# Patient Record
Sex: MALE | Race: WHITE | NOT HISPANIC OR LATINO | Employment: FULL TIME | URBAN - METROPOLITAN AREA
[De-identification: names, ages, dates, MRNs, and addresses within clinical notes are randomized per-mention and may not be internally consistent; named-entity substitution may affect disease eponyms.]

---

## 2019-07-12 ENCOUNTER — OFFICE VISIT (OUTPATIENT)
Dept: INTERNAL MEDICINE | Facility: CLINIC | Age: 34
End: 2019-07-12
Payer: COMMERCIAL

## 2019-07-12 DIAGNOSIS — I10 ESSENTIAL HYPERTENSION: ICD-10-CM

## 2019-07-12 DIAGNOSIS — R79.89 ELEVATED LIVER FUNCTION TESTS: ICD-10-CM

## 2019-07-12 DIAGNOSIS — N50.819 PAIN IN TESTICLE: ICD-10-CM

## 2019-07-12 DIAGNOSIS — R79.89 ELEVATED TSH: ICD-10-CM

## 2019-07-12 DIAGNOSIS — E66.01 MORBID OBESITY (H): ICD-10-CM

## 2019-07-12 DIAGNOSIS — F32.0 MILD MAJOR DEPRESSION (H): ICD-10-CM

## 2019-07-12 DIAGNOSIS — Z00.00 ROUTINE GENERAL MEDICAL EXAMINATION AT A HEALTH CARE FACILITY: Primary | ICD-10-CM

## 2019-07-12 DIAGNOSIS — R06.83 SNORING: ICD-10-CM

## 2019-07-12 LAB
ALBUMIN SERPL-MCNC: 4 G/DL (ref 3.4–5)
ALP SERPL-CCNC: 68 U/L (ref 40–150)
ALT SERPL W P-5'-P-CCNC: 93 U/L (ref 0–70)
ANION GAP SERPL CALCULATED.3IONS-SCNC: 6 MMOL/L (ref 3–14)
AST SERPL W P-5'-P-CCNC: 45 U/L (ref 0–45)
BILIRUB SERPL-MCNC: 0.4 MG/DL (ref 0.2–1.3)
BUN SERPL-MCNC: 9 MG/DL (ref 7–30)
CALCIUM SERPL-MCNC: 8.5 MG/DL (ref 8.5–10.1)
CHLORIDE SERPL-SCNC: 105 MMOL/L (ref 94–109)
CHOLEST SERPL-MCNC: 183 MG/DL
CO2 SERPL-SCNC: 28 MMOL/L (ref 20–32)
CREAT SERPL-MCNC: 0.64 MG/DL (ref 0.66–1.25)
GFR SERPL CREATININE-BSD FRML MDRD: >90 ML/MIN/{1.73_M2}
GLUCOSE SERPL-MCNC: 103 MG/DL (ref 70–99)
HDLC SERPL-MCNC: 35 MG/DL
HGB BLD-MCNC: 14.5 G/DL (ref 13.3–17.7)
LDLC SERPL CALC-MCNC: 107 MG/DL
NONHDLC SERPL-MCNC: 148 MG/DL
POTASSIUM SERPL-SCNC: 4.1 MMOL/L (ref 3.4–5.3)
PROT SERPL-MCNC: 7.8 G/DL (ref 6.8–8.8)
SODIUM SERPL-SCNC: 139 MMOL/L (ref 133–144)
T4 FREE SERPL-MCNC: 1.1 NG/DL (ref 0.76–1.46)
TRIGL SERPL-MCNC: 206 MG/DL
TSH SERPL DL<=0.005 MIU/L-ACNC: 4.19 MU/L (ref 0.4–4)

## 2019-07-12 PROCEDURE — 99385 PREV VISIT NEW AGE 18-39: CPT | Performed by: INTERNAL MEDICINE

## 2019-07-12 PROCEDURE — 84443 ASSAY THYROID STIM HORMONE: CPT | Performed by: INTERNAL MEDICINE

## 2019-07-12 PROCEDURE — 80061 LIPID PANEL: CPT | Performed by: INTERNAL MEDICINE

## 2019-07-12 PROCEDURE — 80053 COMPREHEN METABOLIC PANEL: CPT | Performed by: INTERNAL MEDICINE

## 2019-07-12 PROCEDURE — 36415 COLL VENOUS BLD VENIPUNCTURE: CPT | Performed by: INTERNAL MEDICINE

## 2019-07-12 PROCEDURE — 85018 HEMOGLOBIN: CPT | Performed by: INTERNAL MEDICINE

## 2019-07-12 PROCEDURE — 99214 OFFICE O/P EST MOD 30 MIN: CPT | Mod: 25 | Performed by: INTERNAL MEDICINE

## 2019-07-12 PROCEDURE — 84439 ASSAY OF FREE THYROXINE: CPT | Performed by: INTERNAL MEDICINE

## 2019-07-12 RX ORDER — LISINOPRIL 20 MG/1
20 TABLET ORAL DAILY
Qty: 30 TABLET | Refills: 0 | Status: SHIPPED | OUTPATIENT
Start: 2019-07-12 | End: 2019-08-16

## 2019-07-12 ASSESSMENT — ENCOUNTER SYMPTOMS
ABDOMINAL PAIN: 0
JOINT SWELLING: 0
MYALGIAS: 0
EYE PAIN: 0
DIZZINESS: 0
HEMATOCHEZIA: 0
HEMATURIA: 0
SORE THROAT: 0
NAUSEA: 0
CONSTIPATION: 0
PARESTHESIAS: 0
FREQUENCY: 0
FEVER: 0
SHORTNESS OF BREATH: 0
COUGH: 0
WEAKNESS: 0
HEADACHES: 0
CHILLS: 0
PALPITATIONS: 0
DYSURIA: 0
DIARRHEA: 0
ARTHRALGIAS: 0
NERVOUS/ANXIOUS: 0
HEARTBURN: 0

## 2019-07-12 ASSESSMENT — MIFFLIN-ST. JEOR: SCORE: 2159.3

## 2019-07-12 NOTE — PROGRESS NOTES
SUBJECTIVE:   CC: Jareth Chaparro is an 34 year old male who presents for preventative health visit.     Healthy Habits:     Getting at least 3 servings of Calcium per day:  NO    Bi-annual eye exam:  Yes    Dental care twice a year:  NO    Sleep apnea or symptoms of sleep apnea:  Daytime drowsiness, Excessive snoring and Sleep apnea    Diet:  Regular (no restrictions)    Frequency of exercise:  None    Taking medications regularly:  Yes    Medication side effects:  None    PHQ-2 Total Score: 1    Additional concerns today:  Yes      Patient is also here to discuss several medical issues.    1.  Patient complains of snoring for several years and significant other has mentioned that patient stops breathing at times when sleeping, and patient would like to just with sleep apnea  2.Patient also complains of pain in his bilateral testicles since 2 months but more in the left testes, has not felt any lump.,  No history of injury  3.  Patient is also here to recheck on the blood pressure, has had few high blood pressure readings in the past.  4.  Patient also history of depression in the past and is not on any medications and does not like to take medications at this time.      Today's PHQ-2 Score:   PHQ-2 ( 1999 [employer]) 7/12/2019   Q1: Little interest or pleasure in doing things 1   Q2: Feeling down, depressed or hopeless 0   PHQ-2 Score 1   Q1: Little interest or pleasure in doing things Several days   Q2: Feeling down, depressed or hopeless Not at all   PHQ-2 Score 1       Abuse: Current or Past(Physical, Sexual or Emotional)- No  Do you feel safe in your environment? Yes      Past Medical History:   Diagnosis Date     Juvenile osteochondrosis of spine     chronic LBP     Mild major depression (H)      Other and unspecified disc disorder of unspecified region        Past Surgical History:   Procedure Laterality Date     TONSILLECTOMY         Current Outpatient Medications   Medication Sig Dispense Refill     lisinopril  (PRINIVIL/ZESTRIL) 20 MG tablet Take 1 tablet (20 mg) by mouth daily 30 tablet 0     MOTRIN 800 MG OR TABS ONE TABLET 3 TIMES A DAY WITH FOOD 60 0       Family History   Problem Relation Age of Onset     Other Cancer Father      Multiple myeloma Father      Lung Cancer Maternal Grandmother      Lung Cancer Maternal Grandfather          Social History     Tobacco Use     Smoking status: Light Tobacco Smoker     Types: Cigars     Smokeless tobacco: Never Used     Tobacco comment: 2 times a month   Substance Use Topics     Alcohol use: Yes     Comment: socially          Alcohol Use 7/12/2019   Prescreen: >3 drinks/day or >7 drinks/week? No       Last PSA: No results found for: PSA    Reviewed orders with patient. Reviewed health maintenance and updated orders accordingly - Yes    Reviewed and updated as needed this visit by clinical staff  Tobacco  Allergies  Meds  Med Hx  Surg Hx  Fam Hx  Soc Hx        Reviewed and updated as needed this visit by Provider            Review of Systems   Constitutional: Negative for chills and fever.   HENT: Negative for congestion, ear pain, hearing loss and sore throat.    Eyes: Negative for pain and visual disturbance.   Respiratory: Negative for cough and shortness of breath.         Snoring    Cardiovascular: Negative for chest pain, palpitations and peripheral edema.   Gastrointestinal: Negative for abdominal pain, constipation, diarrhea, heartburn, hematochezia and nausea.   Genitourinary: Negative for discharge, dysuria, frequency, genital sores, hematuria, impotence and urgency.        Pain testes   Musculoskeletal: Negative for arthralgias, joint swelling and myalgias.   Skin: Negative for rash.   Neurological: Negative for dizziness, weakness, headaches and paresthesias.   Psychiatric/Behavioral: Negative for mood changes. The patient is not nervous/anxious.        OBJECTIVE:   BP (!) 160/94 (BP Location: Right arm, Patient Position: Sitting, Cuff Size: Adult Large)    "Pulse 84   Temp 98.3  F (36.8  C) (Oral)   Resp 18   Ht 1.695 m (5' 6.75\")   Wt 126 kg (277 lb 11.2 oz)   SpO2 96%   BMI 43.82 kg/m      Physical Exam  GENERAL: healthy, alert and no distress  EYES: Eyes grossly normal to inspection, PERRL and conjunctivae and sclerae normal  HENT: ear canals and TM's normal, nose and mouth without ulcers or lesions  NECK: no adenopathy, no asymmetry, masses, or scars and thyroid normal to palpation  RESP: lungs clear to auscultation - no rales, rhonchi or wheezes  CV: regular rate and rhythm, normal S1 S2, no S3 or S4, no murmur, click or rub, no peripheral edema and peripheral pulses strong  ABDOMEN: soft, nontender, no hepatosplenomegaly, no masses and bowel sounds normal   (male):  Lt testes- small lump felt above lt testes, no lumps rt testes. ,no hernias and penis normal without urethral discharge  MS: no gross musculoskeletal defects noted, no edema  NEURO: Normal strength and tone, mentation intact and speech normal  PSYCH: mentation appears normal, affect normal/bright      ASSESSMENT/PLAN:     (Z00.00) Routine general medical examination at a health care facility  (primary encounter diagnosis)  Plan: Hemoglobin, Comprehensive metabolic panel,         Lipid panel reflex to direct LDL Fasting, TSH         with free T4 reflex, T4 free, T4 free            (R06.83) Snoring  Plan: r/o sleep apnea, will get sleep study SLEEP EVALUATION & MANAGEMENT REFERRAL - [facility]          [phone] (Age 18 and up)            (E66.01) Morbid obesity (H)  Plan: Discussed in detail about Diet,calorie intake,and importance of regular exercise,       (I10) Essential hypertension  Comment:;elevated BP  Plan: started on lisinopril (PRINIVIL/ZESTRIL) 20 MG tablet as directed.explained clearly about the medication,insructions and side effects.Advised to follow low salt diet and exercise and f/u in 4 wks       (N50.819) Pain in testicle  Plan: ? " "Varicocele lt testes, check US Testicular and Scrotum           (F32.0) Mild major depression (H)  Plan: PHQ 9 score 6 , not on any meds and  does not like to take medications .       COUNSELING:   Reviewed preventive health counseling, as reflected in patient instructions       Regular exercise       Healthy diet/nutrition    Estimated body mass index is 43.82 kg/m  as calculated from the following:    Height as of this encounter: 1.695 m (5' 6.75\").    Weight as of this encounter: 126 kg (277 lb 11.2 oz).     Weight management plan: Discussed healthy diet and exercise guidelines     reports that he has been smoking.  He has never used smokeless tobacco.  Tobacco Cessation Action Plan: pt will try on his own     Counseling Resources:  ATP IV Guidelines  Pooled Cohorts Equation Calculator  FRAX Risk Assessment  ICSI Preventive Guidelines  Dietary Guidelines for Americans, 2010  USDA's MyPlate  ASA Prophylaxis  Lung CA Screening    Bernardino Vanessa MD  [facility]  "

## 2019-07-13 VITALS
HEIGHT: 67 IN | HEART RATE: 84 BPM | TEMPERATURE: 98.3 F | SYSTOLIC BLOOD PRESSURE: 160 MMHG | RESPIRATION RATE: 18 BRPM | DIASTOLIC BLOOD PRESSURE: 94 MMHG | WEIGHT: 277.7 LBS | OXYGEN SATURATION: 96 % | BODY MASS INDEX: 43.58 KG/M2

## 2019-07-13 ASSESSMENT — PATIENT HEALTH QUESTIONNAIRE - PHQ9: SUM OF ALL RESPONSES TO PHQ QUESTIONS 1-9: 6

## 2019-07-25 ENCOUNTER — HOSPITAL ENCOUNTER (OUTPATIENT)
Dept: ULTRASOUND IMAGING | Facility: CLINIC | Age: 34
Discharge: HOME | End: 2019-07-25
Attending: INTERNAL MEDICINE | Admitting: INTERNAL MEDICINE
Payer: COMMERCIAL

## 2019-07-25 DIAGNOSIS — N50.819 PAIN IN TESTICLE: ICD-10-CM

## 2019-07-25 PROCEDURE — 76870 US EXAM SCROTUM: CPT

## 2019-08-09 ENCOUNTER — ANCILLARY PROCEDURE (OUTPATIENT)
Dept: GENERAL RADIOLOGY | Facility: CLINIC | Age: 34
End: 2019-08-09
Attending: NURSE PRACTITIONER
Payer: COMMERCIAL

## 2019-08-09 ENCOUNTER — OFFICE VISIT (OUTPATIENT)
Dept: URGENT CARE | Facility: URGENT CARE | Age: 34
End: 2019-08-09
Payer: COMMERCIAL

## 2019-08-09 VITALS
WEIGHT: 273.2 LBS | DIASTOLIC BLOOD PRESSURE: 70 MMHG | HEART RATE: 103 BPM | BODY MASS INDEX: 43.11 KG/M2 | SYSTOLIC BLOOD PRESSURE: 142 MMHG | TEMPERATURE: 101.2 F | OXYGEN SATURATION: 93 %

## 2019-08-09 DIAGNOSIS — R07.0 THROAT PAIN: Primary | ICD-10-CM

## 2019-08-09 DIAGNOSIS — J18.9 PNEUMONIA DUE TO INFECTIOUS ORGANISM, UNSPECIFIED LATERALITY, UNSPECIFIED PART OF LUNG: ICD-10-CM

## 2019-08-09 DIAGNOSIS — R05.8 PRODUCTIVE COUGH: ICD-10-CM

## 2019-08-09 LAB
DEPRECATED S PYO AG THROAT QL EIA: NORMAL
SPECIMEN SOURCE: NORMAL

## 2019-08-09 PROCEDURE — 87880 STREP A ASSAY W/OPTIC: CPT | Performed by: NURSE PRACTITIONER

## 2019-08-09 PROCEDURE — 99214 OFFICE O/P EST MOD 30 MIN: CPT | Performed by: NURSE PRACTITIONER

## 2019-08-09 PROCEDURE — 87081 CULTURE SCREEN ONLY: CPT | Performed by: NURSE PRACTITIONER

## 2019-08-09 PROCEDURE — 71046 X-RAY EXAM CHEST 2 VIEWS: CPT

## 2019-08-09 RX ORDER — AZITHROMYCIN 250 MG/1
TABLET, FILM COATED ORAL
Qty: 6 TABLET | Refills: 0 | Status: SHIPPED | OUTPATIENT
Start: 2019-08-09 | End: 2019-08-14

## 2019-08-09 NOTE — PROGRESS NOTES
SUBJECTIVE:   Jareth Chaparro is a 34 year old male presenting with a chief complaint of fever, stuffy nose, cough - productive, shortness of breath and sore throat.  Onset of symptoms was 5 day(s) ago.  Course of illness is worsening.    Severity moderate  Current and Associated symptoms: fever, chills, stuffy nose, cough - productive, shortness of breath and sore throat  Treatment measures tried include Tylenol/Ibuprofen, Fluids, Rest and mucinex.  Predisposing factors include None.    Past Medical History:   Diagnosis Date     Juvenile osteochondrosis of spine     chronic LBP     Mild major depression (H)      Other and unspecified disc disorder of unspecified region      Current Outpatient Medications   Medication Sig Dispense Refill     lisinopril (PRINIVIL/ZESTRIL) 20 MG tablet Take 1 tablet (20 mg) by mouth daily 30 tablet 0     MOTRIN 800 MG OR TABS ONE TABLET 3 TIMES A DAY WITH FOOD 60 0     Social History     Tobacco Use     Smoking status: Light Tobacco Smoker     Types: Cigars     Smokeless tobacco: Never Used     Tobacco comment: 2 times a month   Substance Use Topics     Alcohol use: Yes     Comment: socially        ROS:  Review of systems negative except as stated above.    OBJECTIVE:  BP (!) 142/70   Pulse 103   Temp 101.2  F (38.4  C) (Tympanic)   Wt 123.9 kg (273 lb 3.2 oz)   SpO2 93%   BMI 43.11 kg/m    GENERAL APPEARANCE: healthy, alert and no distress  EYES: EOMI,  PERRL, conjunctiva clear  HENT: ear canals and TM's normal.  Nose and mouth without ulcers, erythema or lesions  NECK: supple, nontender, no lymphadenopathy  RESP: lungs with scant rhonchi in the bases  CV: regular rates and rhythm-slightly tachycardic with , normal S1 S2, no murmur noted  ABDOMEN:  soft, nontender, no HSM or masses and bowel sounds normal  SKIN: no suspicious lesions or rashes    Chest x-ray: final read pending  Rapid strep negative    ASSESSMENT:  Community acquired pneumonia    PLAN:  1. Azithromycin X 5  days  2. Albuterol inhaler prn  3. Return if worsening  See orders in Epic    ISABEL Le, CNP

## 2019-08-09 NOTE — LETTER
August 9, 2019      To Whom It May Concern:      Jareth PEREZ Kileyannalisabernarda was seen in our Urgent Care today, 08/09/19.  Please excuse him from work until Tuesday August 13th.       Sincerely,        ISABEL Khan CNP

## 2019-08-10 LAB
BACTERIA SPEC CULT: NORMAL
SPECIMEN SOURCE: NORMAL

## 2019-08-10 NOTE — PATIENT INSTRUCTIONS
Patient Education     Treating Pneumonia  Pneumonia is an infection of one or both of the lungs. Pneumonia:    Is usually caused by either a virus or a bacteria    Can be very serious, especially in infants, young children, and older adults. It s also serious for those with other long-term health problems or weakened immune systems.    Is sometimes treated at home and sometimes in the hospital    Antibiotic medicines  Antibiotics may be prescribed for pneumonia caused by bacteria. They may be pills (oral medicines), or shots (injections). Or they may be given by IV (intravenously) into a vein. If you are taking oral medicines at home:    Fill your prescription and start taking your medicine as soon as you can.    You will likely start to feel better in a day or 2, but don t stop taking the antibiotic.    Use a pill organizer to help you remember to take your medicine.    Let your healthcare provider know if you have side effects.    Take your medicine exactly as directed on the label. Talk to your provider or pharmacist if you have any questions.  Antiviral medicines  Antiviral medicine may be prescribed for pneumonia caused by a virus. For example, antiviral medicine may be prescribed for pneumonia caused by the flu virus. Antibiotics do not work against viruses. If you are taking antiviral medicine at home:    Fill your prescription and start taking your medicine as soon as you can.    Talk with your provider or pharmacist about possible side effects.    Take the medicine exactly as instructed.  To relieve symptoms  There are many medicines that can help relieve symptoms of pneumonia. Some are prescription and some are over-the-counter.  Your healthcare provider may recommend:    Acetaminophen or ibuprofen to lower your fever and to lessen headache or other pain    Cough medicine to loosen mucus or to reduce coughing  Make sure you check with your healthcare provider or pharmacist before taking any  over-the-counter medicines.  Special treatments  If you are hospitalized for pneumonia, you may have other therapies, including:    Inhaled medicines to help with breathing or chest congestion    Supplemental oxygen to increase low oxygen levels  Drink fluids and eat healthy  You should eat healthy to help your body fight the infection. Drinking a lot of fluids helps to replace fluids lost from fever and to loosen mucus in your chest.    Diet. Make healthy food choices, including fruits and vegetables, lean meats and other proteins, 100% whole grain and low- or no-fat dairy products.    Fluids. Drink at least 6 to 8 tall glasses a day. Water and 100% fruit or vegetable juice are best.  Get plenty of rest and sleep  You may be more tired than usual for a while. It is important to get enough sleep at night. It s also important to rest during the day. Talk with your healthcare provider if coughing or other symptoms are interfering with your sleep.  Preventing the spread of germs  The best thing you can do to prevent spreading germs is to wash your hands often. You should:    Rub your hand with soap and water for 20 to 30 seconds.    Clean in between your fingers, the backs of your hands, and around your nails.    Dry your hands on a separate towel or use paper towels.  You should also:    Keep alcohol-based hand  nearby.    Make sure you also clean surfaces that you touch. Use a product that kills all types of germs.    Stay away from others until you are feeling better.  When to call your healthcare provider  Call your healthcare provider if you have any of the following:    Symptoms get worse    Fever continues    Shortness of breath gets worse    Increased mucus or mucus that is darker in color    Coughing gets worse    Lips or fingers are bluish in color    Side effects from your medicine   Date Last Reviewed: 12/1/2016 2000-2018 [employer]. [address] [place] [address], [place] [address]. All  rights reserved. This information is not intended as a substitute for professional medical care. Always follow your healthcare professional's instructions.

## 2019-08-12 ENCOUNTER — OFFICE VISIT (OUTPATIENT)
Dept: URGENT CARE | Facility: URGENT CARE | Age: 34
End: 2019-08-12
Payer: COMMERCIAL

## 2019-08-12 ENCOUNTER — ANCILLARY PROCEDURE (OUTPATIENT)
Dept: GENERAL RADIOLOGY | Facility: CLINIC | Age: 34
End: 2019-08-12
Attending: PHYSICIAN ASSISTANT
Payer: COMMERCIAL

## 2019-08-12 VITALS
BODY MASS INDEX: 43.87 KG/M2 | RESPIRATION RATE: 21 BRPM | DIASTOLIC BLOOD PRESSURE: 80 MMHG | SYSTOLIC BLOOD PRESSURE: 142 MMHG | TEMPERATURE: 101.1 F | WEIGHT: 278 LBS | OXYGEN SATURATION: 92 % | HEART RATE: 109 BPM

## 2019-08-12 DIAGNOSIS — R05.8 PRODUCTIVE COUGH: ICD-10-CM

## 2019-08-12 DIAGNOSIS — R06.2 WHEEZING: ICD-10-CM

## 2019-08-12 DIAGNOSIS — R05.8 PRODUCTIVE COUGH: Primary | ICD-10-CM

## 2019-08-12 PROCEDURE — 99214 OFFICE O/P EST MOD 30 MIN: CPT | Mod: 25 | Performed by: PHYSICIAN ASSISTANT

## 2019-08-12 PROCEDURE — 71046 X-RAY EXAM CHEST 2 VIEWS: CPT

## 2019-08-12 PROCEDURE — 94640 AIRWAY INHALATION TREATMENT: CPT | Performed by: PHYSICIAN ASSISTANT

## 2019-08-12 RX ORDER — IPRATROPIUM BROMIDE AND ALBUTEROL SULFATE 2.5; .5 MG/3ML; MG/3ML
3 SOLUTION RESPIRATORY (INHALATION) ONCE
Status: COMPLETED | OUTPATIENT
Start: 2019-08-12 | End: 2019-08-12

## 2019-08-12 RX ORDER — PREDNISONE 20 MG/1
20 TABLET ORAL DAILY
Qty: 5 TABLET | Refills: 0 | Status: SHIPPED | OUTPATIENT
Start: 2019-08-12 | End: 2019-08-16

## 2019-08-12 RX ORDER — CODEINE PHOSPHATE AND GUAIFENESIN 10; 100 MG/5ML; MG/5ML
1-2 SOLUTION ORAL EVERY 4 HOURS PRN
Qty: 240 ML | Refills: 0 | Status: SHIPPED | OUTPATIENT
Start: 2019-08-12 | End: 2019-08-16

## 2019-08-12 RX ORDER — DOXYCYCLINE 100 MG/1
100 CAPSULE ORAL 2 TIMES DAILY
Qty: 20 CAPSULE | Refills: 0 | Status: SHIPPED | OUTPATIENT
Start: 2019-08-12 | End: 2019-08-14 | Stop reason: ALTCHOICE

## 2019-08-12 RX ADMIN — IPRATROPIUM BROMIDE AND ALBUTEROL SULFATE 3 ML: 2.5; .5 SOLUTION RESPIRATORY (INHALATION) at 21:13

## 2019-08-12 NOTE — LETTER
[facility]  [address] [address]  [phone]      August 12, 2019    RE:  Jareth Chaparro                                                                                                                                                       [address] [facility] [address]            To whom it may concern:    Jareth Chaparro was seen in clinic today for a febrile illness.  He may return to work when he has been fever free for 24 hours.        Sincerely,        Glenys Sultana    [facility]

## 2019-08-13 ENCOUNTER — TELEPHONE (OUTPATIENT)
Dept: URGENT CARE | Facility: URGENT CARE | Age: 34
End: 2019-08-13

## 2019-08-13 NOTE — PROGRESS NOTES
Patient presents with:  Urgent Care: cough, sob, trouble breathing deeply, lightheaded, eye drainage with crusting, loss of voice.       SUBJECTIVE:   Jareth Chaparro is a 34 year old male presenting with a chief complaint of   1) persistent productive cough for the past 5 days  2) feels short of breath, using albuterol inhaler.    3) persistent fever    Onset of symptoms was 1 week ago  Course of illness is worsening.    Severity moderate  Current and Associated symptoms: as above  Treatment measures tried include albuterol inhaler and zithromax.  Predisposing factors include None.    Past Medical History:   Diagnosis Date     Juvenile osteochondrosis of spine     chronic LBP     Mild major depression (H)      Other and unspecified disc disorder of unspecified region      Patient Active Problem List   Diagnosis     Backache     Depressive disorder, not elsewhere classified     Morbid obesity (H)     Mild major depression (H)     Social History     Tobacco Use     Smoking status: Light Tobacco Smoker     Types: Cigars     Smokeless tobacco: Never Used     Tobacco comment: 2 times a month   Substance Use Topics     Alcohol use: Yes     Comment: socially        ROS:  CONSTITUTIONAL:NEGATIVE for fever, chills, change in weight  INTEGUMENTARY/SKIN: NEGATIVE for worrisome rashes, moles or lesions  EYES: NEGATIVE for vision changes or irritation  ENT/MOUTH: as per HPI  RESP:as per HPI  CV: NEGATIVE for chest pain, palpitations or peripheral edema  GI: NEGATIVE for nausea, abdominal pain, heartburn, or change in bowel habits  MUSCULOSKELETAL: NEGATIVE for significant arthralgias or myalgia  NEURO: NEGATIVE for weakness, dizziness or paresthesias  Review of systems negative except as stated above.    OBJECTIVE  :BP (!) 142/80   Pulse 109   Temp 101.1  F (38.4  C) (Oral)   Resp 21   Wt 126.1 kg (278 lb)   SpO2 92%   BMI 43.87 kg/m    GENERAL APPEARANCE: healthy, alert and no distress  EYES: EOMI,  PERRL, conjunctiva  clear  HENT: ear canals and TM's normal.  Nose and mouth without ulcers, erythema or lesions  NECK: supple, nontender, no lymphadenopathy  RESP: wheezing which improves with neb in clinic   CV: regular rates and rhythm, normal S1 S2, no murmur noted  ABDOMEN:  soft, nontender, no HSM or masses and bowel sounds normal  NEURO: Normal strength and tone, sensory exam grossly normal,  normal speech and mentation  SKIN: no suspicious lesions or rashes    CXR: no change from previous as per my interpretation    (R05) Productive cough  (primary encounter diagnosis)  Comment:   Plan: XR Chest 2 Views, doxycycline hyclate         (VIBRAMYCIN) 100 MG capsule,         guaiFENesin-codeine (ROBITUSSIN AC) 100-10         MG/5ML solution            (R06.2) Wheezing  Comment:   Plan: INHALATION/NEBULIZER TREATMENT, INITIAL,         ipratropium - albuterol 0.5 mg/2.5 mg/3 mL         (DUONEB) neb solution 3 mL, predniSONE         (DELTASONE) 20 MG tablet          Follow up with primary clinic for re-check within 3-5 days    Warm compress to eyes as needed.      Albuterol inhaler as needed.

## 2019-08-13 NOTE — PATIENT INSTRUCTIONS
(R05) Productive cough  (primary encounter diagnosis)  Comment:   Plan: XR Chest 2 Views, doxycycline hyclate         (VIBRAMYCIN) 100 MG capsule,         guaiFENesin-codeine (ROBITUSSIN AC) 100-10         MG/5ML solution            (R06.2) Wheezing  Comment:   Plan: INHALATION/NEBULIZER TREATMENT, INITIAL,         ipratropium - albuterol 0.5 mg/2.5 mg/3 mL         (DUONEB) neb solution 3 mL, predniSONE         (DELTASONE) 20 MG tablet          Follow up with primary clinic for re-check within 3-5 days    Warm compress to eyes as needed.      Albuterol inhaler as needed.

## 2019-08-14 ENCOUNTER — APPOINTMENT (OUTPATIENT)
Dept: GENERAL RADIOLOGY | Facility: CLINIC | Age: 34
End: 2019-08-14
Attending: EMERGENCY MEDICINE
Payer: COMMERCIAL

## 2019-08-14 ENCOUNTER — OFFICE VISIT (OUTPATIENT)
Dept: INTERNAL MEDICINE | Facility: CLINIC | Age: 34
End: 2019-08-14
Payer: COMMERCIAL

## 2019-08-14 ENCOUNTER — HOSPITAL ENCOUNTER (EMERGENCY)
Facility: CLINIC | Age: 34
Discharge: HOME | End: 2019-08-14
Attending: EMERGENCY MEDICINE | Admitting: EMERGENCY MEDICINE
Payer: COMMERCIAL

## 2019-08-14 VITALS
WEIGHT: 275.6 LBS | SYSTOLIC BLOOD PRESSURE: 122 MMHG | RESPIRATION RATE: 22 BRPM | HEIGHT: 68 IN | BODY MASS INDEX: 41.77 KG/M2 | OXYGEN SATURATION: 90 % | DIASTOLIC BLOOD PRESSURE: 92 MMHG | TEMPERATURE: 98.7 F | HEART RATE: 99 BPM

## 2019-08-14 VITALS
OXYGEN SATURATION: 96 % | WEIGHT: 275 LBS | HEIGHT: 69 IN | BODY MASS INDEX: 40.73 KG/M2 | DIASTOLIC BLOOD PRESSURE: 92 MMHG | SYSTOLIC BLOOD PRESSURE: 145 MMHG | HEART RATE: 82 BPM | RESPIRATION RATE: 29 BRPM | TEMPERATURE: 98.7 F

## 2019-08-14 DIAGNOSIS — J18.9 PNEUMONIA OF RIGHT LUNG DUE TO INFECTIOUS ORGANISM, UNSPECIFIED PART OF LUNG: ICD-10-CM

## 2019-08-14 DIAGNOSIS — R06.03 RESPIRATORY DISTRESS: Primary | ICD-10-CM

## 2019-08-14 DIAGNOSIS — J20.9 ACUTE BRONCHITIS WITH SYMPTOMS > 10 DAYS: ICD-10-CM

## 2019-08-14 LAB
ALBUMIN SERPL-MCNC: 3.1 G/DL (ref 3.4–5)
ALP SERPL-CCNC: 106 U/L (ref 40–150)
ALT SERPL W P-5'-P-CCNC: 245 U/L (ref 0–70)
ANION GAP SERPL CALCULATED.3IONS-SCNC: 6 MMOL/L (ref 3–14)
AST SERPL W P-5'-P-CCNC: 146 U/L (ref 0–45)
BASOPHILS # BLD AUTO: 0.1 10E9/L (ref 0–0.2)
BASOPHILS NFR BLD AUTO: 0.3 %
BILIRUB SERPL-MCNC: 0.3 MG/DL (ref 0.2–1.3)
BUN SERPL-MCNC: 15 MG/DL (ref 7–30)
CALCIUM SERPL-MCNC: 9.2 MG/DL (ref 8.5–10.1)
CHLORIDE SERPL-SCNC: 102 MMOL/L (ref 94–109)
CO2 SERPL-SCNC: 30 MMOL/L (ref 20–32)
CREAT SERPL-MCNC: 0.61 MG/DL (ref 0.66–1.25)
DIFFERENTIAL METHOD BLD: ABNORMAL
EOSINOPHIL # BLD AUTO: 0 10E9/L (ref 0–0.7)
EOSINOPHIL NFR BLD AUTO: 0.2 %
ERYTHROCYTE [DISTWIDTH] IN BLOOD BY AUTOMATED COUNT: 13.7 % (ref 10–15)
GFR SERPL CREATININE-BSD FRML MDRD: >90 ML/MIN/{1.73_M2}
GLUCOSE SERPL-MCNC: 120 MG/DL (ref 70–99)
HCT VFR BLD AUTO: 39.7 % (ref 40–53)
HGB BLD-MCNC: 12.8 G/DL (ref 13.3–17.7)
IMM GRANULOCYTES # BLD: 0.4 10E9/L (ref 0–0.4)
IMM GRANULOCYTES NFR BLD: 2.4 %
LACTATE SERPL-SCNC: 1.3 MMOL/L (ref 0.4–2)
LYMPHOCYTES # BLD AUTO: 1.5 10E9/L (ref 0.8–5.3)
LYMPHOCYTES NFR BLD AUTO: 9.8 %
MCH RBC QN AUTO: 29.3 PG (ref 26.5–33)
MCHC RBC AUTO-ENTMCNC: 32.2 G/DL (ref 31.5–36.5)
MCV RBC AUTO: 91 FL (ref 78–100)
MONOCYTES # BLD AUTO: 0.5 10E9/L (ref 0–1.3)
MONOCYTES NFR BLD AUTO: 2.9 %
NEUTROPHILS # BLD AUTO: 13.3 10E9/L (ref 1.6–8.3)
NEUTROPHILS NFR BLD AUTO: 84.4 %
NRBC # BLD AUTO: 0 10*3/UL
NRBC BLD AUTO-RTO: 0 /100
PLATELET # BLD AUTO: 495 10E9/L (ref 150–450)
POTASSIUM SERPL-SCNC: 3.9 MMOL/L (ref 3.4–5.3)
PROT SERPL-MCNC: 8.7 G/DL (ref 6.8–8.8)
RBC # BLD AUTO: 4.37 10E12/L (ref 4.4–5.9)
SODIUM SERPL-SCNC: 138 MMOL/L (ref 133–144)
WBC # BLD AUTO: 15.7 10E9/L (ref 4–11)

## 2019-08-14 PROCEDURE — 36415 COLL VENOUS BLD VENIPUNCTURE: CPT | Performed by: EMERGENCY MEDICINE

## 2019-08-14 PROCEDURE — 96374 THER/PROPH/DIAG INJ IV PUSH: CPT

## 2019-08-14 PROCEDURE — 99214 OFFICE O/P EST MOD 30 MIN: CPT | Performed by: INTERNAL MEDICINE

## 2019-08-14 PROCEDURE — 25000128 H RX IP 250 OP 636: Performed by: EMERGENCY MEDICINE

## 2019-08-14 PROCEDURE — 93005 ELECTROCARDIOGRAM TRACING: CPT

## 2019-08-14 PROCEDURE — 99285 EMERGENCY DEPT VISIT HI MDM: CPT | Mod: 25

## 2019-08-14 PROCEDURE — 96361 HYDRATE IV INFUSION ADD-ON: CPT

## 2019-08-14 PROCEDURE — 94640 AIRWAY INHALATION TREATMENT: CPT

## 2019-08-14 PROCEDURE — 85025 COMPLETE CBC W/AUTO DIFF WBC: CPT | Performed by: EMERGENCY MEDICINE

## 2019-08-14 PROCEDURE — 71046 X-RAY EXAM CHEST 2 VIEWS: CPT

## 2019-08-14 PROCEDURE — 36415 COLL VENOUS BLD VENIPUNCTURE: CPT

## 2019-08-14 PROCEDURE — 83605 ASSAY OF LACTIC ACID: CPT | Performed by: EMERGENCY MEDICINE

## 2019-08-14 PROCEDURE — 25000125 ZZHC RX 250: Performed by: EMERGENCY MEDICINE

## 2019-08-14 PROCEDURE — 87040 BLOOD CULTURE FOR BACTERIA: CPT | Performed by: EMERGENCY MEDICINE

## 2019-08-14 PROCEDURE — 80053 COMPREHEN METABOLIC PANEL: CPT | Performed by: EMERGENCY MEDICINE

## 2019-08-14 RX ORDER — IPRATROPIUM BROMIDE AND ALBUTEROL SULFATE 2.5; .5 MG/3ML; MG/3ML
3 SOLUTION RESPIRATORY (INHALATION)
Status: DISCONTINUED | OUTPATIENT
Start: 2019-08-14 | End: 2019-08-14 | Stop reason: HOSPADM

## 2019-08-14 RX ORDER — SODIUM CHLORIDE 9 MG/ML
1000 INJECTION, SOLUTION INTRAVENOUS CONTINUOUS
Status: DISCONTINUED | OUTPATIENT
Start: 2019-08-14 | End: 2019-08-14 | Stop reason: HOSPADM

## 2019-08-14 RX ORDER — METHYLPREDNISOLONE SODIUM SUCCINATE 125 MG/2ML
125 INJECTION, POWDER, LYOPHILIZED, FOR SOLUTION INTRAMUSCULAR; INTRAVENOUS ONCE
Status: COMPLETED | OUTPATIENT
Start: 2019-08-14 | End: 2019-08-14

## 2019-08-14 RX ORDER — MORPHINE SULFATE 4 MG/ML
4 INJECTION, SOLUTION INTRAMUSCULAR; INTRAVENOUS
Status: DISCONTINUED | OUTPATIENT
Start: 2019-08-14 | End: 2019-08-14 | Stop reason: HOSPADM

## 2019-08-14 RX ORDER — METHYLPREDNISOLONE 4 MG
TABLET, DOSE PACK ORAL
Qty: 21 TABLET | Refills: 0 | Status: SHIPPED | OUTPATIENT
Start: 2019-08-14 | End: 2019-08-23

## 2019-08-14 RX ORDER — ONDANSETRON 2 MG/ML
4 INJECTION INTRAMUSCULAR; INTRAVENOUS EVERY 30 MIN PRN
Status: DISCONTINUED | OUTPATIENT
Start: 2019-08-14 | End: 2019-08-14 | Stop reason: HOSPADM

## 2019-08-14 RX ADMIN — SODIUM CHLORIDE 1000 ML: 9 INJECTION, SOLUTION INTRAVENOUS at 16:50

## 2019-08-14 RX ADMIN — IPRATROPIUM BROMIDE AND ALBUTEROL SULFATE 3 ML: .5; 3 SOLUTION RESPIRATORY (INHALATION) at 16:51

## 2019-08-14 RX ADMIN — METHYLPREDNISOLONE SODIUM SUCCINATE 125 MG: 125 INJECTION, POWDER, FOR SOLUTION INTRAMUSCULAR; INTRAVENOUS at 16:51

## 2019-08-14 RX ADMIN — IPRATROPIUM BROMIDE AND ALBUTEROL SULFATE 3 ML: .5; 3 SOLUTION RESPIRATORY (INHALATION) at 19:11

## 2019-08-14 ASSESSMENT — ENCOUNTER SYMPTOMS
SHORTNESS OF BREATH: 1
COUGH: 1
DIARRHEA: 0
VOMITING: 0
VOICE CHANGE: 1
FEVER: 1

## 2019-08-14 ASSESSMENT — MIFFLIN-ST. JEOR
SCORE: 2182.77
SCORE: 2161.67

## 2019-08-14 NOTE — ED PROVIDER NOTES
History     Chief Complaint:  Shortness of Breath    HPI  Jareth Chaparro is a 34 year old male who presents to the emergency department today for evaluation of a cough and shortness of breath that began 2 weeks ago. He was seen in urgent care with this and a fever of 101 and was diagnosed with laryngitis. He was prescribed Vibramycin here and has finished there course of antibiotics but still has a strong productive cough and hoarse voice. He reports his symptoms will seem to be getting better but this is often intermittent. He has an albuterol inhaler that he has been using 2-3x daily but feels this has stopped being any help for his symptoms. He denies any vomiting or diarrhea. Both his wife and daughter have been home sick with similar symptoms.       Allergies:  No known drug allergies    Medications:    Albuterol  Vibramycin  Robitussin  Lisinopril  Prednisone    Past Medical History:    Depressive disorder  Morbid obesity  Back pain    Past Surgical History:     tonsillectomy.    Family History:    The patient's family history includes Multiple myeloma in his father; Other Cancer in his father.    Social History:  The patient reports that he has been smoking cigars.  He has never used smokeless tobacco. He reports that he drinks alcohol. He reports that he does not use drugs.   PCP: Bernardino Vanessa  Marital Status:  [2]      Review of Systems   Constitutional: Positive for fever.   HENT: Positive for voice change.    Respiratory: Positive for cough and shortness of breath.    Gastrointestinal: Negative for diarrhea and vomiting.   All other systems reviewed and are negative.      Physical Exam     Patient Vitals for the past 24 hrs:   BP Temp Temp src Pulse Heart Rate Resp SpO2 Height Weight   08/14/19 1830 (!) 141/87 -- -- 105 92 11 96 % -- --   08/14/19 1800 (!) 140/80 -- -- 90 92 15 94 % -- --   08/14/19 1700 (!) 143/89 -- -- 87 92 (!) 34 95 % -- --   08/14/19 1616 (!) 147/93 98.7  F (37.1  " C) Oral -- 84 28 97 % 1.753 m (5' 9\") 124.7 kg (275 lb)     Physical Exam   Constitutional: He appears well-developed and well-nourished. He appears distressed.   HENT:   Right Ear: External ear normal.   Left Ear: External ear normal.   Mouth/Throat: Oropharynx is clear and moist. No oropharyngeal exudate.   TM's clear bilaterally   Eyes: Pupils are equal, round, and reactive to light. Conjunctivae and EOM are normal. No scleral icterus.   Neck: Normal range of motion. Neck supple.   Cardiovascular: Normal rate, regular rhythm, normal heart sounds and intact distal pulses. Exam reveals no gallop and no friction rub.   No murmur heard.  Pulmonary/Chest: Effort normal. No respiratory distress. He has no wheezes. He has no rales.   Active cough. Slight right basilar crackle.   Abdominal: Soft. Bowel sounds are normal. He exhibits no distension and no mass. There is no tenderness.   Musculoskeletal: He exhibits no edema.   Lymphadenopathy:     He has no cervical adenopathy.   Neurological: He is alert.   Skin: Skin is warm. Capillary refill takes less than 2 seconds. No rash noted.   diaphoretic   Psychiatric: He has a normal mood and affect.         Emergency Department Course     ECG:  ECG taken at 1613, ECG read at 1614  Normal sinus rhythm  Normal ECG  Rate 84 bpm. CO interval 148 ms. QRS duration 102 ms. QT/QTc 368/434 ms. P-R-T axes 42 70 42.    Imaging:  Radiology findings were communicated with the patient who voiced understanding of the findings.  XR Chest 2 views  Findings: negative chest Reading per radiology    Laboratory:  Laboratory findings were communicated with the patient who voiced understanding of the findings.    CBC: WBC 15.7 (H), HGB 12.7 (L),  (H) o/w WNL.   CMP: Glucose 120 (H), albumin 3.1 (L),  (H),  (H), o/w WNL (Creatinine: 0.61 (L))    Lactic Acid: 1.3  Blood culture in progress     Interventions:  1650 NS 1L IV Bolus  1651 Duoneb, 3 mL, nebulization   1651 " methylprednisolone 125 mg IV     Emergency Department Course:  Past medical records, nursing notes, and vitals reviewed.  1620: I performed an exam of the patient and obtained history, as documented above.     IV was inserted and blood was drawn for laboratory testing, results above.  The patient was sent for a Chest XR while in the emergency department, findings above.    1838: I rechecked the patient. Explained findings to patient and mother.    I personally reviewed the laboratory/imaging results with the Patient and answered all related questions prior to discharge    I rechecked the patient.  Findings and plan explained to the Patient and mother. Patient discharged home with instructions regarding supportive care, medications, and reasons to return. The importance of close follow-up was reviewed.     Impression & Plan      Medical Decision Making:  Jareth Chaparro is a 34 year old male who presents to the emergency department today for evaluation of shortness of breath, cough, possible pneumonia. His white count is elevated although he has been on prednisone recently. I did repeat XR which was still negative. likely he has bronchitis which is not getting any better. It looks like he was under treated with his current dose of prednisone. He has only been doing albuterol inhaler intermittently. He had some mild crackle on the right side and is doing better after a duoneb rather than albuterol so I will send him back home with this. He is afebrile here and getting fluids and feeling better. His O2 sat was 96% with sitting up and deep breathing. It was 93% while sleeping. He has a constant dry cough. Culture has been obtained. I will switch him over to Augmentin. Patient was given a work notice and since he does have cough medicine at home he refused prescribed medicine. He will follow up with his doctor in 2 days who will see how he is doing. If symptoms worsen with no response to ED his is told to return to the  ED.     Diagnosis:    ICD-10-CM   1. Acute bronchitis with symptoms > 10 days J20.9       Disposition:   discharged to home    Discharge Medications:     Medication List      Started    amoxicillin-clavulanate 875-125 MG tablet  Commonly known as:  AUGMENTIN  1 tablet, Oral, 2 TIMES DAILY     Ipratropium-Albuterol  MCG/ACT inhaler  Commonly known as:  COMBIVENT RESPIMAT  1 puff, Inhalation, 4 TIMES DAILY     methylPREDNISolone 4 MG tablet therapy pack  Commonly known as:  MEDROL DOSEPAK  Follow Package Directions            Scribe Disclosure:  Kristal KU, am serving as a scribe at 4:20 PM on 8/14/2019 to document services personally performed by Jere Carpenter MD based on my observations and the provider's statements to me.    [facility] EMERGENCY DEPARTMENT       Jere Carpenter MD  08/14/19 [id number]

## 2019-08-14 NOTE — PROGRESS NOTES
Subjective     Jareth Chaparro is a 34 year old male who presents to clinic today for the following health issues:    [facility]   ED/ Followup:    Facility:  [facility]  Date of visit: 8/12/19  Reason for visit: SOB  Current Status: Cough, SOB. Fatigue, dizziness     Present with mother. Patient lost his voice and communicates with a pen and paper as it is too painful to talk.     Patient first presented to  on 8/9 for evaluation of cough, SOB, and sore throat. Chest x-ray was negative for pneumonia. He was started on azithromycin and albuterol prn. He then returned to  on 8/12 due to worsening symptoms. Chest x-ray was negative again for pneumonia. He was then switched to doxycycline and prescribed Robitussin AC, prednisone 20 mg. He was given a neb while at , but he does not have any nebulizers at home.     Today, mother notes that he was at [facility] 2 weeks ago in [place]. He was sick when he returned.     He has not been checking his temp at home but he has been feeling feverish and chilled. He reports severe sinus tenderness and congestion. Nasal discharge is bright yellow/green in color. Cough is productive of white/yellow colored phlegm. When he wakes up there is significant eye discharge. Patient continues to feel SOB. The SOB is constant, even at rest. He has no hx of asthma. Denies any headaches, nausea, vomiting, or diarrhea.       Reviewed and updated as needed this visit by Provider         Review of Systems   REVIEW OF SYSTEMS: The following systems have been completely reviewed and are negative except as noted above:   Constitutional, HEENT, respiratory, cardiovascular, gastrointestinal, genitourinary systems.      This document serves as a record of the services and decisions personally performed and made by Parish Kovacs MD. It was created on his behalf by Johanna Ng, a trained medical scribe. The creation of this document is based on the provider's statements to the medical  "eloySameer Ng August 14, 2019 3:31 PM           Objective    BP (!) 122/92   Pulse 99   Temp 98.7  F (37.1  C) (Oral)   Resp 22   Ht 1.715 m (5' 7.5\")   Wt 125 kg (275 lb 9.6 oz)   SpO2 90%   BMI 42.53 kg/m    Body mass index is 42.53 kg/m .     Physical Exam   GENERAL: healthy, alert. Using accessory muscles to breathe, breathing slightly labored. and no distress  EYES: Eyes grossly normal to inspection, PERRL and conjunctivae and sclerae normal  HENT: ear canals and TM's normal, nose and mouth without ulcers or lesions. Positive for bimaxillary tenderness  NECK: no adenopathy, no asymmetry, masses, or scars and thyroid normal to palpation  RESP: Accessory respiratory muscle use noted. Diffuse mild expiratory wheezing/expiratory prolongation. Inspiratory crackles on right anterior and posterior lung fields noted  CV: regular rate and rhythm, normal S1 S2, no S3 or S4, no murmur, click or rub, no peripheral edema and peripheral pulses strong  MS: no gross musculoskeletal defects noted, no edema  SKIN: no suspicious lesions or rashes  NEURO: Normal strength and tone, mentation intact and speech normal  PSYCH: mentation appears normal, affect normal/bright    Diagnostic Test Results:  Labs reviewed in Epic        Assessment & Plan     (R06.03) Respiratory distress  (primary encounter diagnosis)  (J18.9) Pneumonia of right lung due to infectious organism, unspecified part of lung  Comment: Continued clinical decline despite two recent urgent care encounters, oral antibiotics and corticosteroids. Despite two recently normal CXR's, clinical exam raises concern for right sided pneumonia.   Patient transferred by Dr Kovacs via wheelchair to [facility] ED for further evaluation and treatment.     Persisting symptoms with worsening SOB despite 2 courses of antibiotics and prednisone. Recommended patient present to ED for further evaluation and care. Dr. oKvacs transported patient to ED via wheelchair. " "        Tobacco Cessation:   reports that he has been smoking cigars.  He has never used smokeless tobacco.      BMI:   Estimated body mass index is 42.53 kg/m  as calculated from the following:    Height as of this encounter: 1.715 m (5' 7.5\").    Weight as of this encounter: 125 kg (275 lb 9.6 oz).   Weight management plan: Discussed healthy diet and exercise guidelines    FUTURE APPOINTMENTS:       - Follow-up visit in to be determined     There are no Patient Instructions on file for this visit.    The information in this document, created by the medical scribe for me, accurately reflects the services I personally performed and the decisions made by me. I have reviewed and approved this document for accuracy prior to leaving the patient care area.  August 14, 2019 3:46 PM    Parish Kovacs MD,   [facility]        "

## 2019-08-14 NOTE — ED AVS SNAPSHOT
[facility] Emergency Department  [address]  [facility] [address]  Phone:  [phone]  Fax:  [phone]                                    Jareth Chaparro   MRN: [id number]    Department:  [facility] Emergency Department   Date of Visit:  8/14/2019           After Visit Summary Signature Page    I have received my discharge instructions, and my questions have been answered. I have discussed any challenges I see with this plan with the nurse or doctor.    ..........................................................................................................................................  Patient/Patient Representative Signature      ..........................................................................................................................................  Patient Representative Print Name and Relationship to Patient    ..................................................               ................................................  Date                                   Time    ..........................................................................................................................................  Reviewed by Signature/Title    ...................................................              ..............................................  Date                                               Time          22EPIC Rev 08/18

## 2019-08-14 NOTE — ED TRIAGE NOTES
Pt presents for evaluation of shortness of breath, productive cough, diaphoresis and laryngitis. Pt was seen at an UC a few days ago and given abx. Pt was also given prednisone. Pt has gotten worse since treatment began. Pt also c/o left sided chest pain.

## 2019-08-14 NOTE — DISCHARGE INSTRUCTIONS
Albuterol 2 puffs in between the combivent inhaler as needed  Push fluids and rest  Start antibiotic today

## 2019-08-14 NOTE — NURSING NOTE
"BP (!) 122/92   Pulse 99   Temp 98.7  F (37.1  C) (Oral)   Resp 22   Ht 1.715 m (5' 7.5\")   Wt 125 kg (275 lb 9.6 oz)   SpO2 90%   BMI 42.53 kg/m    Patient in for UC follow up.  Margarita Piper, [facility]    "

## 2019-08-14 NOTE — LETTER
August 14, 2019      To Whom It May Concern:      Jareth Chaparro was seen in our Emergency Department today, 08/14/19.  I expect his condition to improve over the next 5 days.  He may return to work/school when improved.    Sincerely,        Jere Carpenter MD

## 2019-08-15 ENCOUNTER — HOSPITAL ENCOUNTER (EMERGENCY)
Facility: CLINIC | Age: 34
Discharge: HOME | End: 2019-08-15
Attending: PHYSICIAN ASSISTANT | Admitting: PHYSICIAN ASSISTANT
Payer: COMMERCIAL

## 2019-08-15 ENCOUNTER — APPOINTMENT (OUTPATIENT)
Dept: CT IMAGING | Facility: CLINIC | Age: 34
End: 2019-08-15
Attending: PHYSICIAN ASSISTANT
Payer: COMMERCIAL

## 2019-08-15 ENCOUNTER — APPOINTMENT (OUTPATIENT)
Dept: GENERAL RADIOLOGY | Facility: CLINIC | Age: 34
End: 2019-08-15
Attending: PHYSICIAN ASSISTANT
Payer: COMMERCIAL

## 2019-08-15 VITALS
HEART RATE: 82 BPM | DIASTOLIC BLOOD PRESSURE: 73 MMHG | OXYGEN SATURATION: 95 % | TEMPERATURE: 99.9 F | RESPIRATION RATE: 19 BRPM | SYSTOLIC BLOOD PRESSURE: 139 MMHG

## 2019-08-15 DIAGNOSIS — S22.32XA CLOSED FRACTURE OF ONE RIB OF LEFT SIDE, INITIAL ENCOUNTER: ICD-10-CM

## 2019-08-15 DIAGNOSIS — J18.9 PNEUMONIA DUE TO INFECTIOUS ORGANISM, UNSPECIFIED LATERALITY, UNSPECIFIED PART OF LUNG: ICD-10-CM

## 2019-08-15 LAB
ALBUMIN SERPL-MCNC: 3.1 G/DL (ref 3.4–5)
ALP SERPL-CCNC: 90 U/L (ref 40–150)
ALT SERPL W P-5'-P-CCNC: 185 U/L (ref 0–70)
ANION GAP SERPL CALCULATED.3IONS-SCNC: 6 MMOL/L (ref 3–14)
AST SERPL W P-5'-P-CCNC: 75 U/L (ref 0–45)
BASOPHILS # BLD AUTO: 0.1 10E9/L (ref 0–0.2)
BASOPHILS NFR BLD AUTO: 0.3 %
BILIRUB SERPL-MCNC: 0.2 MG/DL (ref 0.2–1.3)
BUN SERPL-MCNC: 23 MG/DL (ref 7–30)
CALCIUM SERPL-MCNC: 9 MG/DL (ref 8.5–10.1)
CHLORIDE SERPL-SCNC: 102 MMOL/L (ref 94–109)
CO2 SERPL-SCNC: 28 MMOL/L (ref 20–32)
CREAT SERPL-MCNC: 0.7 MG/DL (ref 0.66–1.25)
D DIMER PPP FEU-MCNC: 1.2 UG/ML FEU (ref 0–0.5)
DIFFERENTIAL METHOD BLD: ABNORMAL
EOSINOPHIL # BLD AUTO: 0 10E9/L (ref 0–0.7)
EOSINOPHIL NFR BLD AUTO: 0 %
ERYTHROCYTE [DISTWIDTH] IN BLOOD BY AUTOMATED COUNT: 13.7 % (ref 10–15)
GFR SERPL CREATININE-BSD FRML MDRD: >90 ML/MIN/{1.73_M2}
GLUCOSE SERPL-MCNC: 206 MG/DL (ref 70–99)
HCT VFR BLD AUTO: 39 % (ref 40–53)
HGB BLD-MCNC: 12.5 G/DL (ref 13.3–17.7)
IMM GRANULOCYTES # BLD: 0.6 10E9/L (ref 0–0.4)
IMM GRANULOCYTES NFR BLD: 3.8 %
INTERPRETATION ECG - MUSE: NORMAL
LACTATE BLD-SCNC: 2 MMOL/L (ref 0.7–2)
LYMPHOCYTES # BLD AUTO: 2 10E9/L (ref 0.8–5.3)
LYMPHOCYTES NFR BLD AUTO: 12.9 %
MCH RBC QN AUTO: 28.9 PG (ref 26.5–33)
MCHC RBC AUTO-ENTMCNC: 32.1 G/DL (ref 31.5–36.5)
MCV RBC AUTO: 90 FL (ref 78–100)
MONOCYTES # BLD AUTO: 0.8 10E9/L (ref 0–1.3)
MONOCYTES NFR BLD AUTO: 5 %
NEUTROPHILS # BLD AUTO: 12 10E9/L (ref 1.6–8.3)
NEUTROPHILS NFR BLD AUTO: 78 %
NRBC # BLD AUTO: 0 10*3/UL
NRBC BLD AUTO-RTO: 0 /100
PLATELET # BLD AUTO: 567 10E9/L (ref 150–450)
POTASSIUM SERPL-SCNC: 4.2 MMOL/L (ref 3.4–5.3)
PROT SERPL-MCNC: 7.9 G/DL (ref 6.8–8.8)
RBC # BLD AUTO: 4.33 10E12/L (ref 4.4–5.9)
SODIUM SERPL-SCNC: 136 MMOL/L (ref 133–144)
TROPONIN I SERPL-MCNC: <0.015 UG/L (ref 0–0.04)
WBC # BLD AUTO: 15.4 10E9/L (ref 4–11)

## 2019-08-15 PROCEDURE — 93005 ELECTROCARDIOGRAM TRACING: CPT

## 2019-08-15 PROCEDURE — 83605 ASSAY OF LACTIC ACID: CPT | Performed by: PHYSICIAN ASSISTANT

## 2019-08-15 PROCEDURE — 85025 COMPLETE CBC W/AUTO DIFF WBC: CPT | Performed by: PHYSICIAN ASSISTANT

## 2019-08-15 PROCEDURE — 99285 EMERGENCY DEPT VISIT HI MDM: CPT | Mod: 25

## 2019-08-15 PROCEDURE — 84484 ASSAY OF TROPONIN QUANT: CPT | Performed by: PHYSICIAN ASSISTANT

## 2019-08-15 PROCEDURE — 94640 AIRWAY INHALATION TREATMENT: CPT

## 2019-08-15 PROCEDURE — 25000132 ZZH RX MED GY IP 250 OP 250 PS 637: Performed by: PHYSICIAN ASSISTANT

## 2019-08-15 PROCEDURE — 71045 X-RAY EXAM CHEST 1 VIEW: CPT

## 2019-08-15 PROCEDURE — 85379 FIBRIN DEGRADATION QUANT: CPT | Performed by: PHYSICIAN ASSISTANT

## 2019-08-15 PROCEDURE — 25000128 H RX IP 250 OP 636: Performed by: PHYSICIAN ASSISTANT

## 2019-08-15 PROCEDURE — 80053 COMPREHEN METABOLIC PANEL: CPT | Performed by: PHYSICIAN ASSISTANT

## 2019-08-15 PROCEDURE — 71260 CT THORAX DX C+: CPT

## 2019-08-15 PROCEDURE — 83605 ASSAY OF LACTIC ACID: CPT | Mod: 91 | Performed by: PHYSICIAN ASSISTANT

## 2019-08-15 PROCEDURE — 25000125 ZZHC RX 250: Performed by: EMERGENCY MEDICINE

## 2019-08-15 PROCEDURE — 82803 BLOOD GASES ANY COMBINATION: CPT | Performed by: PHYSICIAN ASSISTANT

## 2019-08-15 PROCEDURE — 25000125 ZZHC RX 250: Performed by: PHYSICIAN ASSISTANT

## 2019-08-15 RX ORDER — OXYCODONE HYDROCHLORIDE 5 MG/1
5 TABLET ORAL EVERY 6 HOURS PRN
Qty: 8 TABLET | Refills: 0 | Status: SHIPPED | OUTPATIENT
Start: 2019-08-15 | End: 2019-08-16

## 2019-08-15 RX ORDER — KETOROLAC TROMETHAMINE 15 MG/ML
15 INJECTION, SOLUTION INTRAMUSCULAR; INTRAVENOUS ONCE
Status: COMPLETED | OUTPATIENT
Start: 2019-08-15 | End: 2019-08-15

## 2019-08-15 RX ORDER — IPRATROPIUM BROMIDE AND ALBUTEROL SULFATE 2.5; .5 MG/3ML; MG/3ML
3 SOLUTION RESPIRATORY (INHALATION) ONCE
Status: COMPLETED | OUTPATIENT
Start: 2019-08-15 | End: 2019-08-15

## 2019-08-15 RX ORDER — BENZONATATE 200 MG/1
200 CAPSULE ORAL 3 TIMES DAILY PRN
Qty: 10 CAPSULE | Refills: 0 | Status: SHIPPED | OUTPATIENT
Start: 2019-08-15 | End: 2019-08-16

## 2019-08-15 RX ORDER — OXYCODONE HYDROCHLORIDE 5 MG/1
5 TABLET ORAL ONCE
Status: COMPLETED | OUTPATIENT
Start: 2019-08-15 | End: 2019-08-15

## 2019-08-15 RX ORDER — IOPAMIDOL 755 MG/ML
500 INJECTION, SOLUTION INTRAVASCULAR ONCE
Status: COMPLETED | OUTPATIENT
Start: 2019-08-15 | End: 2019-08-15

## 2019-08-15 RX ADMIN — SODIUM CHLORIDE 90 ML: 9 INJECTION, SOLUTION INTRAVENOUS at 22:13

## 2019-08-15 RX ADMIN — IPRATROPIUM BROMIDE AND ALBUTEROL SULFATE 3 ML: .5; 3 SOLUTION RESPIRATORY (INHALATION) at 20:13

## 2019-08-15 RX ADMIN — KETOROLAC TROMETHAMINE 15 MG: 15 INJECTION, SOLUTION INTRAMUSCULAR; INTRAVENOUS at 20:59

## 2019-08-15 RX ADMIN — IOPAMIDOL 83 ML: 755 INJECTION, SOLUTION INTRAVENOUS at 22:13

## 2019-08-15 RX ADMIN — OXYCODONE HYDROCHLORIDE 5 MG: 5 TABLET ORAL at 20:58

## 2019-08-15 ASSESSMENT — ENCOUNTER SYMPTOMS
FEVER: 1
SHORTNESS OF BREATH: 1
COUGH: 1
CHEST TIGHTNESS: 1

## 2019-08-15 NOTE — ED AVS SNAPSHOT
[facility] Emergency Department  [address]  [facility] [address]  Phone:  [phone]  Fax:  [phone]                                    Jareth Chaparro   MRN: [id number]    Department:  [facility] Emergency Department   Date of Visit:  8/15/2019           After Visit Summary Signature Page    I have received my discharge instructions, and my questions have been answered. I have discussed any challenges I see with this plan with the nurse or doctor.    ..........................................................................................................................................  Patient/Patient Representative Signature      ..........................................................................................................................................  Patient Representative Print Name and Relationship to Patient    ..................................................               ................................................  Date                                   Time    ..........................................................................................................................................  Reviewed by Signature/Title    ...................................................              ..............................................  Date                                               Time          22EPIC Rev 08/18

## 2019-08-16 ENCOUNTER — OFFICE VISIT (OUTPATIENT)
Dept: INTERNAL MEDICINE | Facility: CLINIC | Age: 34
End: 2019-08-16
Payer: COMMERCIAL

## 2019-08-16 VITALS
WEIGHT: 280.7 LBS | SYSTOLIC BLOOD PRESSURE: 137 MMHG | RESPIRATION RATE: 14 BRPM | DIASTOLIC BLOOD PRESSURE: 84 MMHG | HEIGHT: 69 IN | TEMPERATURE: 98.2 F | BODY MASS INDEX: 41.57 KG/M2 | OXYGEN SATURATION: 93 % | HEART RATE: 80 BPM

## 2019-08-16 DIAGNOSIS — I10 ESSENTIAL HYPERTENSION: ICD-10-CM

## 2019-08-16 DIAGNOSIS — J18.9 PNEUMONIA OF BOTH LUNGS DUE TO INFECTIOUS ORGANISM, UNSPECIFIED PART OF LUNG: ICD-10-CM

## 2019-08-16 DIAGNOSIS — S22.39XD CLOSED FRACTURE OF ONE RIB WITH ROUTINE HEALING, UNSPECIFIED LATERALITY, SUBSEQUENT ENCOUNTER: Primary | ICD-10-CM

## 2019-08-16 LAB
CO2 BLDCOV-SCNC: 26 MMOL/L (ref 21–28)
INTERPRETATION ECG - MUSE: NORMAL
PCO2 BLDV: 40 MM HG (ref 40–50)
PH BLDV: 7.43 PH (ref 7.32–7.43)
PO2 BLDV: 47 MM HG (ref 25–47)
SAO2 % BLDV FROM PO2: 84 %

## 2019-08-16 PROCEDURE — 99214 OFFICE O/P EST MOD 30 MIN: CPT | Performed by: INTERNAL MEDICINE

## 2019-08-16 RX ORDER — LISINOPRIL 20 MG/1
20 TABLET ORAL DAILY
Qty: 30 TABLET | Refills: 3 | Status: SHIPPED | OUTPATIENT
Start: 2019-08-16 | End: 2020-03-22

## 2019-08-16 RX ORDER — BENZONATATE 200 MG/1
200 CAPSULE ORAL 3 TIMES DAILY PRN
Qty: 60 CAPSULE | Refills: 1 | Status: SHIPPED | OUTPATIENT
Start: 2019-08-16 | End: 2019-09-05

## 2019-08-16 RX ORDER — PREDNISONE 20 MG/1
20 TABLET ORAL DAILY
Qty: 10 TABLET | Refills: 0 | Status: SHIPPED | OUTPATIENT
Start: 2019-08-16 | End: 2019-09-05

## 2019-08-16 RX ORDER — OXYCODONE HYDROCHLORIDE 5 MG/1
5 TABLET ORAL EVERY 6 HOURS PRN
Qty: 56 TABLET | Refills: 0 | Status: SHIPPED | OUTPATIENT
Start: 2019-08-16 | End: 2019-09-05

## 2019-08-16 RX ORDER — CODEINE PHOSPHATE AND GUAIFENESIN 10; 100 MG/5ML; MG/5ML
2 SOLUTION ORAL EVERY 4 HOURS PRN
Qty: 240 ML | Refills: 2 | Status: SHIPPED | OUTPATIENT
Start: 2019-08-16 | End: 2019-09-05

## 2019-08-16 ASSESSMENT — MIFFLIN-ST. JEOR: SCORE: 2208.63

## 2019-08-16 NOTE — ED PROVIDER NOTES
"  History     Chief Complaint:  Chest Wall Pain    HPI   Jareth Chaparro is a 34 year old male who presents to the emergency department today for evaluation of chest wall pain. The patient has had a cough and shortness of breath for the past week. He was seen in  08/12/19 and was prescribed doxycycline. He was seen here yesterday by Dr. Carpenter and had a negative CXR and lab work. He was prescribed Augmentin, albuterol, and a Medrol Dosepak. Here today he has the same symptoms as yesterday but states he was coughing and felt a \"pop in his left side, causing significant pain now.  He is concerned for possible rib fracture.  Here, he appears very uncomfortable. He last had a fever of 101 on 08/12/19 but is afebrile since and is afebrile here. He was camping with family and everybody got sick with similar symptoms, but everyone else has improved. Today is the first day his voice has returned after losing it secondary to his cough. He has taken no medications for pain.  He has been taking ibuprofen and Tylenol without relief.  He expresses no further concerns at this time.    Allergies:  No Known Drug Allergies    Medications:    Augmentin  Albuterol inhaler  Doxycycline  Ipratropium-albuterol inhaler     Past Medical History:    Depression    Past Surgical History:    History reviewed. No pertinent surgical history.    Family History:    History reviewed. No pertinent family history.    Social History:  The patient was accompanied to the ED by his wife.  Smoking Status: Smoker  Smokeless Tobacco: Never Used  Alcohol Use: Positive   Marital Status:        Review of Systems   Constitutional: Positive for fever (resolved).   Respiratory: Positive for cough, chest tightness and shortness of breath.    Musculoskeletal:        Chest wall pain, left side   All other systems reviewed and are negative.    Physical Exam     Patient Vitals for the past 24 hrs:   BP Temp Temp src Pulse Heart Rate Resp SpO2   08/15/19 2255 " -- -- -- -- 77 19 95 %   08/15/19 2254 -- -- -- -- 79 25 96 %   08/15/19 2253 -- -- -- -- 77 24 96 %   08/15/19 2252 -- -- -- -- 82 24 95 %   08/15/19 2246 -- -- -- -- 82 20 91 %   08/15/19 2245 139/73 -- -- 82 79 21 93 %   08/15/19 2244 -- -- -- -- 84 24 94 %   08/15/19 2243 -- -- -- -- 80 18 95 %   08/15/19 2209 -- -- -- -- -- 20 94 %   08/15/19 2208 -- -- -- -- 89 24 95 %   08/15/19 2207 -- -- -- -- 87 21 93 %   08/15/19 2206 -- -- -- -- 80 24 94 %   08/15/19 2153 -- -- -- -- 86 26 94 %   08/15/19 2152 -- -- -- -- 86 24 93 %   08/15/19 2151 -- -- -- -- 86 23 94 %   08/15/19 2150 -- -- -- -- 89 25 96 %   08/15/19 2149 -- -- -- -- 84 23 92 %   08/15/19 2148 -- -- -- -- 85 12 93 %   08/15/19 2147 -- -- -- -- 82 23 94 %   08/15/19 2146 -- -- -- -- 89 20 96 %   08/15/19 2145 128/74 -- -- 91 99 19 95 %   08/15/19 2144 -- -- -- -- 79 28 93 %   08/15/19 2143 -- -- -- -- 81 24 94 %   08/15/19 2142 -- -- -- -- 87 23 93 %   08/15/19 2141 -- -- -- -- 90 20 97 %   08/15/19 2140 -- -- -- -- 86 18 92 %   08/15/19 2139 -- -- -- -- 91 17 93 %   08/15/19 2138 -- -- -- -- 77 20 93 %   08/15/19 2137 -- -- -- -- 84 20 93 %   08/15/19 2136 -- -- -- -- 77 14 95 %   08/15/19 2135 -- -- -- -- 79 22 93 %   08/15/19 2134 -- -- -- -- 81 28 95 %   08/15/19 2133 -- -- -- -- 73 21 95 %   08/15/19 2132 -- -- -- -- 78 12 93 %   08/15/19 2131 -- -- -- -- 77 25 92 %   08/15/19 2130 134/76 -- -- 78 81 25 94 %   08/15/19 2128 -- -- -- -- 77 26 93 %   08/15/19 2127 -- -- -- -- 79 22 92 %   08/15/19 2126 -- -- -- -- 77 18 94 %   08/15/19 2125 -- -- -- -- 76 (!) 31 95 %   08/15/19 2124 -- -- -- -- 90 23 (!) 88 %   08/15/19 2121 -- -- -- -- 76 26 95 %   08/15/19 2120 -- -- -- -- 80 26 97 %   08/15/19 2119 -- -- -- -- 94 24 96 %   08/15/19 2118 -- -- -- -- 79 21 94 %   08/15/19 2117 -- -- -- -- 87 15 93 %   08/15/19 2115 132/71 -- -- 79 77 20 94 %   08/15/19 2114 -- -- -- -- 77 21 93 %   08/15/19 2113 -- -- -- -- 80 (!) 37 93 %   08/15/19 2112 -- -- --  -- 80 21 93 %   08/15/19 2111 -- -- -- -- 77 21 94 %   08/15/19 2109 -- -- -- -- 86 10 95 %   08/15/19 2108 -- -- -- -- 87 18 93 %   08/15/19 2107 -- -- -- -- 86 29 94 %   08/15/19 2106 -- -- -- -- 84 13 93 %   08/15/19 2105 -- -- -- -- 90 24 94 %   08/15/19 2101 -- -- -- -- 78 28 --   08/15/19 2100 (!) 135/94 -- -- 78 83 15 --   08/15/19 2059 -- -- -- -- 101 (!) 40 --   08/15/19 2058 -- -- -- -- -- 28 92 %   08/15/19 2057 -- -- -- -- -- 22 94 %   08/15/19 2055 -- -- -- -- 86 28 97 %   08/15/19 2054 -- -- -- -- -- (!) 47 94 %   08/15/19 2053 -- -- -- -- 84 26 92 %   08/15/19 2050 -- -- -- -- 79 20 93 %   08/15/19 2049 -- -- -- -- 82 17 92 %   08/15/19 2047 -- -- -- -- 87 (!) 32 96 %   08/15/19 2046 -- -- -- -- -- -- 91 %   08/15/19 2045 (!) 140/98 -- -- 75 -- -- 93 %   08/15/19 2041 -- -- -- -- -- -- 95 %   08/15/19 2040 138/86 -- -- 93 -- -- 96 %   08/15/19 2009 (!) 150/78 99.9  F (37.7  C) Temporal 106 106 20 92 %      Physical Exam  General: Alert and oriented. Appears uncomfortable. Holding left rib cage. Hyperventilating.   Head:  The scalp, face, and head appear normal   Eyes:  Conjunctivae and sclerae are normal    ENT:    The oropharynx is normal    Uvula is in the midline    Neck:  No lymphadenopathy  CV:  Regular rate and rhythm     Normal S1/S2    Peripheral pulses intact in all 4 extremities.  Resp:  Lungs are clear to auscultation    Non-labored    No rales or wheezing     Equal air entry throughout.   GI:  Abdomen is soft, non-distended    No abdominal tenderness   MS:  Normal muscular tone   Skin:  No rash or acute skin lesions noted.  No ecchymosis.  No obvious deformity.  No step-offs noted.  No crepitations.   Neuro: Speech is normal and fluent.      Emergency Department Course     ECG:  ECG taken at 2104, ECG read at 2112  Normal sinus rhythm  Normal ECG  Rate 74 bpm. OK interval 152 ms. QRS duration 98 ms. QT/QTc 366/406 ms. P-R-T axes 50 66 33.    Imaging:  Radiology findings were communicated  with the patient who voiced understanding of the findings.    CT Chest Pulmonary Embolism w Contrast  1.  Mildly displaced left ninth posterior rib fracture.  2.  Tree-in-bud nodular infiltrates in both lower lobes compatible with infection.  3.  No pulmonary embolism.  Reading per radiology    XR CHEST PORT 1 VW  Negative Chest  Reading per radiology     Laboratory:  Laboratory findings were communicated with the patient who voiced understanding of the findings.    Lactic acid: 2.0  Troponin I: <0.015  D dimer: 1.2  CBC: WBC 15.4, HGB 12.5,   CMP: Glucose 206, Albumin 3.1, , AST 75, o/w WNL (Creatinine 0.70)    Interventions:  2013 Duoneb, 3 mL, nebulization    2058 Oxycodone 5 mg PO  2059 Toradol 15 mg IV    Emergency Department Course:    2020 Nursing notes and vitals reviewed.    2035 I performed an exam of the patient as documented above.     2051 IV was inserted and blood was drawn for laboratory testing, results above.     2051 The patient was sent for a XR while in the emergency department, results above.      2212 The patient was sent for a CT while in the emergency department, results above.      2245 Patient was rechecked and updated.     2330 I personally reviewed the lab and imaging results with the patient and answered all related questions prior to discharge.    Impression & Plan      Medical Decision Making:  Jareth Chaparro is a 34 year old male who presents to the emergency department today for evaluation of chest wall pain.  He states that he has had a horrible cough and URI over the last week.  He has been seen multiple times for this, but continues to have severe symptoms.  He is also noted severe pain to the left rib area, which worsened significantly today, prompting his reevaluation here.  On exam, he has tenderness to the left anterior ribs.  He is in a lot of distress upon my initial evaluation secondary to pain.  EKG is normal without evidence of ischemia or arrythmia.   Troponin negative.  Basic labs demonstrates a leukocytosis, but is otherwise unremarkable.  D-dimer is mildly elevated at 1.2.  Therefore a CT scan PE study was obtained.  This was negative for PE, but did demonstrate findings concerning for pneumonia and did demonstrate 9th rib fracture.  Patient was given the above interventions with improvement in symptoms.  He was ambulated with oximeter and was oxygenating well.  I did discuss at home care versus inpatient treatment, and with shared decision-making, he opted for at home care.  He was given a prescription for Tessalon to control his cough, and incentive spirometer, and oxycodone for any breakthrough pain in addition to Tylenol and ibuprofen.  He will follow-up with the primary physician tomorrow as scheduled.  He is asked return immediately for any high fevers, uncontrolled pain, or any other concerns.  All questions were answered prior to discharge.  The patient understands and agrees to this plan.     Diagnosis:    ICD-10-CM    1. Closed fracture of one rib of left side, initial encounter S22.32XA    2. Pneumonia due to infectious organism, unspecified laterality, unspecified part of lung J18.9      Disposition:   Discharge    Discharge Medications:  New Prescriptions    BENZONATATE (TESSALON) 200 MG CAPSULE    Take 1 capsule (200 mg) by mouth 3 times daily as needed for cough    OXYCODONE (ROXICODONE) 5 MG TABLET    Take 1 tablet (5 mg) by mouth every 6 hours as needed for pain     Scribe Disclosure:  Rex KU, am serving as a scribe at 8:44 PM on 8/15/2019 to document services personally performed by Gaby Grant PA based on my observations and the provider's statements to me.    [facility] EMERGENCY DEPARTMENT       Gaby Grant PA-C  08/16/19 [id number]

## 2019-08-16 NOTE — LETTER
[place] [address]  [facility] [address]  [phone]    8/16/2019     Jareth Chaparro  1985       To Whom it May Concern:  Mr. Chaparro is under our care for acute bronchitis with bronchospasm. This has been complicated by left rib fracture. He will not be able to work for 2-3 weeks and will probably not be able to lift more than 10-20 pounds for 6 weeks.         Sincerely,           Yocasta Lucio MD

## 2019-08-16 NOTE — PROGRESS NOTES
Subjective     Jareth Chaparro is a 34 year old male who presents to clinic today for the following health issues:    HPI   ED/ Followup:    Facility:  and   [facility]  Date of visit: 8/9, 8/12, 8/14/19 & 8/15/19  Reason for visit: Pneumonia and rib fracture  Current Status: Still coughing a lot, it's hard to catch breathe, can't take deep breathe, His chest  feels better   He had gone in initially on 8/9 to urgent care with 5 days of cough.  They felt he had pneumonia put him on Z-Eben.  He followed up on the 12th and was put on doxycycline, then seen at the ER on 8/14 and 8/15.  Most recent visit he was found to have a rib fracture and bilateral pneumonias.  He was changed eventually to Augmentin.  He reports that his cough is still very frequent, very annoying.  He is finishing a Medrol Dosepak, using a Combivent inhaler.  He is not really having much improvement in the cough.  His temperature has come down.  The fractures of the left posterior ninth rib.      Patient Active Problem List   Diagnosis     Backache     Depressive disorder, not elsewhere classified     Morbid obesity (H)     Mild major depression (H)     Current Outpatient Medications   Medication Sig Dispense Refill     albuterol (PROAIR RESPICLICK) 108 (90 Base) MCG/ACT inhaler Inhale 2 puffs into the lungs every 6 hours as needed for shortness of breath / dyspnea or wheezing 1 each 0     benzonatate (TESSALON) 200 MG capsule Take 1 capsule (200 mg) by mouth 3 times daily as needed for cough 60 capsule 1     guaiFENesin-codeine (ROBITUSSIN AC) 100-10 MG/5ML solution Take 10 mLs by mouth every 4 hours as needed for cough 240 mL 2     Ipratropium-Albuterol (COMBIVENT RESPIMAT)  MCG/ACT inhaler Inhale 1 puff into the lungs 4 times daily 1 Inhaler 0     lisinopril (PRINIVIL/ZESTRIL) 20 MG tablet Take 1 tablet (20 mg) by mouth daily 30 tablet 3     methylPREDNISolone (MEDROL DOSEPAK) 4 MG tablet therapy pack Follow Package  "Directions 21 tablet 0     oxyCODONE (ROXICODONE) 5 MG tablet Take 1 tablet (5 mg) by mouth every 6 hours as needed for pain 56 tablet 0     predniSONE (DELTASONE) 20 MG tablet Take 1 tablet (20 mg) by mouth daily 10 tablet 0      Social History     Tobacco Use     Smoking status: Light Tobacco Smoker     Types: Cigars     Smokeless tobacco: Never Used     Tobacco comment: 2 times a month   Substance Use Topics     Alcohol use: Yes     Comment: socially      Drug use: No      Reviewed and updated as needed this visit by Provider         Review of Systems   No fever, chills, sore throat, sinus symptoms, nasal congestion, pain in his chest is on the left related to the rib fracture with minimal soreness the rest of the chest, minimal shortness of breath      Objective    /84 (BP Location: Right arm, Patient Position: Sitting, Cuff Size: Adult Large)   Pulse 80   Temp 98.2  F (36.8  C) (Oral)   Resp 14   Ht 1.753 m (5' 9\")   Wt 127.3 kg (280 lb 11.2 oz)   SpO2 93%   BMI 41.45 kg/m    Body mass index is 41.45 kg/m .  Physical Exam     Lungs: Few crackles at the bases, very soft wheezes  Tender posterior chest          Assessment & Plan     1. Closed fracture of one rib with routine healing, unspecified laterality, subsequent encounter  He has continued pain, will continue his pain medicine for a little bit longer and work on a very aggressive cough treatment  - oxyCODONE (ROXICODONE) 5 MG tablet; Take 1 tablet (5 mg) by mouth every 6 hours as needed for pain  Dispense: 56 tablet; Refill: 0    2. Pneumonia of both lungs due to infectious organism, unspecified part of lung  Recommend he continue his antibiotic, inhalers follow-up as needed I gave him a printed prescription for a little longer prednisone since there is still some wheezing, recommend Tessalon and advised how to use it correctly, Robitussin with codeine for the cough in the evening or when at home, use Robitussin-DM during the day    3. " Essential hypertension  Needs refill  - lisinopril (PRINIVIL/ZESTRIL) 20 MG tablet; Take 1 tablet (20 mg) by mouth daily  Dispense: 30 tablet; Refill: 3            No follow-ups on file.    Yocasta Lucio MD  [facility]

## 2019-08-16 NOTE — DISCHARGE INSTRUCTIONS
Discharge Instructions  Bronchitis, Pneumonia, Bronchospasm    You were seen today for a chest infection or inflammation. If your provider decided this was due to a bacterial infection, you may need an antibiotic. Sometimes these are caused by a virus, and then an antibiotic will not help.     Generally, every Emergency Department visit should have a follow-up clinic visit with either a primary or a specialty clinic/provider. Please follow-up as instructed by your emergency provider today.    Return to the Emergency Department if:  Your breathing gets much worse.  You are very weak, or feel much more ill.  You develop new symptoms, such as chest pain.  You cough up blood.  You are vomiting (throwing up) enough that you cannot keep fluids or your medicine down.    What can I do to help myself?  Fill any prescriptions the provider gave you and take them right away--especially antibiotics. Be sure to finish the whole antibiotic prescription.  You may be given a prescription for an inhaler, which can help loosen tight air passages.  Use this as needed, but not more often than directed. Inhalers work much better when used with a spacer.   You may be given a prescription for a steroid to reduce inflammation. Used long-term, these can have side effects, but for short-term use they are safe. You may notice restlessness or increased appetite.      You may use non-prescription cough or cold medicines. Cough medicines may help, but don t make the cough go away completely.   Avoid smoke, because this can make your symptoms worse. If you smoke, this may be a good time to quit! Consider using nicotine lozenges, gum, or patches to reduce cravings.   If you have a fever, Tylenol  (acetaminophen), Motrin  (ibuprofen), or Advil  (ibuprofen) may help bring fever down and may help you feel more comfortable. Be sure to read and follow the package directions, and ask your provider if you have questions.  Be sure to get your flu shot each  year.  For certain ages, the pneumonia shot can help prevent pneumonia.  If you were given a prescription for medicine here today, be sure to read all of the information (including the package insert) that comes with your prescription.  This will include important information about the medicine, its side effects, and any warnings that you need to know about.  The pharmacist who fills the prescription can provide more information and answer questions you may have about the medicine.  If you have questions or concerns that the pharmacist cannot address, please call or return to the Emergency Department.     Remember that you can always come back to the Emergency Department if you are not able to see your regular provider in the amount of time listed above, if you get any new symptoms, or if there is anything that worries you.

## 2019-08-16 NOTE — ED TRIAGE NOTES
"Pt seen yesterday and was improving for bronchitis today coughed and felt a \"pop\" in his chest now very uncomfortable appearing, panting and holding left chest   "

## 2019-08-20 LAB
BACTERIA SPEC CULT: NO GROWTH
BACTERIA SPEC CULT: NO GROWTH
Lab: NORMAL
Lab: NORMAL
SPECIMEN SOURCE: NORMAL
SPECIMEN SOURCE: NORMAL

## 2019-08-23 ENCOUNTER — OFFICE VISIT (OUTPATIENT)
Dept: INTERNAL MEDICINE | Facility: CLINIC | Age: 34
End: 2019-08-23
Payer: COMMERCIAL

## 2019-08-23 VITALS
HEART RATE: 97 BPM | OXYGEN SATURATION: 99 % | SYSTOLIC BLOOD PRESSURE: 125 MMHG | TEMPERATURE: 98.3 F | RESPIRATION RATE: 18 BRPM | DIASTOLIC BLOOD PRESSURE: 75 MMHG | WEIGHT: 272.9 LBS | BODY MASS INDEX: 40.42 KG/M2 | HEIGHT: 69 IN

## 2019-08-23 DIAGNOSIS — E66.01 MORBID OBESITY (H): ICD-10-CM

## 2019-08-23 DIAGNOSIS — R79.89 ELEVATED LIVER FUNCTION TESTS: ICD-10-CM

## 2019-08-23 DIAGNOSIS — R73.09 ELEVATED GLUCOSE: Primary | ICD-10-CM

## 2019-08-23 DIAGNOSIS — D72.828 OTHER ELEVATED WHITE BLOOD CELL (WBC) COUNT: ICD-10-CM

## 2019-08-23 DIAGNOSIS — S22.39XD CLOSED FRACTURE OF ONE RIB WITH ROUTINE HEALING, UNSPECIFIED LATERALITY, SUBSEQUENT ENCOUNTER: ICD-10-CM

## 2019-08-23 DIAGNOSIS — I10 ESSENTIAL HYPERTENSION: ICD-10-CM

## 2019-08-23 LAB
ALT SERPL W P-5'-P-CCNC: 92 U/L (ref 0–70)
AST SERPL W P-5'-P-CCNC: 53 U/L (ref 0–45)
ERYTHROCYTE [DISTWIDTH] IN BLOOD BY AUTOMATED COUNT: 14.9 % (ref 10–15)
HBA1C MFR BLD: 6.6 % (ref 0–5.6)
HCT VFR BLD AUTO: 45.9 % (ref 40–53)
HGB BLD-MCNC: 14.5 G/DL (ref 13.3–17.7)
MCH RBC QN AUTO: 28.8 PG (ref 26.5–33)
MCHC RBC AUTO-ENTMCNC: 31.6 G/DL (ref 31.5–36.5)
MCV RBC AUTO: 91 FL (ref 78–100)
PLATELET # BLD AUTO: ABNORMAL 10E9/L (ref 150–450)
RBC # BLD AUTO: 5.03 10E12/L (ref 4.4–5.9)
WBC # BLD AUTO: 11.6 10E9/L (ref 4–11)

## 2019-08-23 PROCEDURE — 99214 OFFICE O/P EST MOD 30 MIN: CPT | Performed by: INTERNAL MEDICINE

## 2019-08-23 PROCEDURE — 84460 ALANINE AMINO (ALT) (SGPT): CPT | Performed by: INTERNAL MEDICINE

## 2019-08-23 PROCEDURE — 83036 HEMOGLOBIN GLYCOSYLATED A1C: CPT | Performed by: INTERNAL MEDICINE

## 2019-08-23 PROCEDURE — 36415 COLL VENOUS BLD VENIPUNCTURE: CPT | Performed by: INTERNAL MEDICINE

## 2019-08-23 PROCEDURE — 84450 TRANSFERASE (AST) (SGOT): CPT | Performed by: INTERNAL MEDICINE

## 2019-08-23 PROCEDURE — 85027 COMPLETE CBC AUTOMATED: CPT | Performed by: INTERNAL MEDICINE

## 2019-08-23 ASSESSMENT — MIFFLIN-ST. JEOR: SCORE: 2173.25

## 2019-08-23 NOTE — PROGRESS NOTES
Subjective     Jareth Chaparro is a 34 year old male who presents to clinic today along with his mother for the following health issues:    HPI   Hypertension Follow-up      Do you check your blood pressure regularly outside of the clinic? No on lisinopril 20 mg daily. Tolerating well    Are you following a low salt diet? No    Are your blood pressures ever more than 140 on the top number (systolic) OR more   than 90 on the bottom number (diastolic), for example 140/90? No    Pt was diagnosed with recent lt rib fracture and is on prn oxycodone with pain relief . No SOB or cough,     Pt also had elevated glucose and elevated Liver function , is here to follow up;    Pt has h/o depression in the past and currently not on any meds and says his depression has resolved.         How many servings of fruits and vegetables do you eat daily?  0-1    On average, how many sweetened beverages do you drink each day (soda, juice, sweet tea, etc)?   2    How many days per week do you miss taking your medication? 0        Patient Active Problem List   Diagnosis     Backache     Depressive disorder, not elsewhere classified     Morbid obesity (H)     Mild major depression (H)     Past Surgical History:   Procedure Laterality Date     TONSILLECTOMY         Social History     Tobacco Use     Smoking status: Light Tobacco Smoker     Types: Cigars     Smokeless tobacco: Never Used     Tobacco comment: 2 times a month   Substance Use Topics     Alcohol use: Yes     Comment: socially      Family History   Problem Relation Age of Onset     Other Cancer Father      Multiple myeloma Father      Lung Cancer Maternal Grandmother      Lung Cancer Maternal Grandfather          Current Outpatient Medications   Medication Sig Dispense Refill     albuterol (PROAIR RESPICLICK) 108 (90 Base) MCG/ACT inhaler Inhale 2 puffs into the lungs every 6 hours as needed for shortness of breath / dyspnea or wheezing 1 each 0     benzonatate (TESSALON) 200 MG  "capsule Take 1 capsule (200 mg) by mouth 3 times daily as needed for cough 60 capsule 1     guaiFENesin-codeine (ROBITUSSIN AC) 100-10 MG/5ML solution Take 10 mLs by mouth every 4 hours as needed for cough 240 mL 2     Ipratropium-Albuterol (COMBIVENT RESPIMAT)  MCG/ACT inhaler Inhale 1 puff into the lungs 4 times daily 1 Inhaler 0     lisinopril (PRINIVIL/ZESTRIL) 20 MG tablet Take 1 tablet (20 mg) by mouth daily 30 tablet 3     oxyCODONE (ROXICODONE) 5 MG tablet Take 1 tablet (5 mg) by mouth every 6 hours as needed for pain 56 tablet 0     predniSONE (DELTASONE) 20 MG tablet Take 1 tablet (20 mg) by mouth daily 10 tablet 0          Reviewed and updated as needed this visit by Provider         Review of Systems   ROS COMP: CONSTITUTIONAL: NEGATIVE for fever, chills, change in weight  EYES: NEGATIVE for vision changes or irritation  ENT/MOUTH: NEGATIVE for ear, mouth and throat problems  RESP: NEGATIVE for significant cough or SOB  CV: NEGATIVE for chest pain, palpitations or peripheral edema  MUSCULOSKELETAL: NEGATIVE for significant arthralgias or myalgia  NEURO: NEGATIVE for weakness, dizziness or paresthesias  ENDOCRINE: NEGATIVE for temperature intolerance, skin/hair changes  PSYCHIATRIC: NEGATIVE for changes in mood or affect      Objective    /75 (BP Location: Left arm, Patient Position: Sitting, Cuff Size: Adult Large)   Pulse 97   Temp 98.3  F (36.8  C) (Oral)   Resp 18   Ht 1.753 m (5' 9\")   Wt 123.8 kg (272 lb 14.4 oz)   SpO2 99%   BMI 40.30 kg/m    Body mass index is 40.3 kg/m .  Physical Exam   GENERAL: healthy, alert and no distress  EYES: Eyes grossly normal to inspection, PERRL and conjunctivae and sclerae normal  NECK: no adenopathy, no asymmetry, masses, or scars and thyroid normal to palpation  RESP: lungs clear to auscultation - no rales, rhonchi or wheezes  CV: regular rate and rhythm,    MS: no LE edema, no calf tenderness  Ext; no edema, no calf tenderness  NEURO: Normal " "strength and tone, mentation intact and speech normal        Assessment & Plan     (I10) Essential hypertension  Plan: BP controlled , continue lisinopril 20 mg daily, continue to follow low-salt diet regular exercise and follow-up in 6 months      (R73.09) Elevated glucose    Plan: Hemoglobin A1c       (S22.39XD) Closed fracture of one rib with routine healing, unspecified laterality, subsequent encounter  Plan: Clinically improving.Call or return to clinic prn if these symtoms worsen, fail to improve as anticipated, or if new symptoms develop.      (R94.5) Elevated liver function tests  Plan: ALT, AST            (D72.828) Other elevated white blood cell (WBC) count  Plan: CBC with platelets             (E66.01) Morbid obesity (H)  Plan: Discussed in detail about Diet,calorie intake,and importance of regular exercise,       BMI:   Estimated body mass index is 40.3 kg/m  as calculated from the following:    Height as of this encounter: 1.753 m (5' 9\").    Weight as of this encounter: 123.8 kg (272 lb 14.4 oz).   Weight management plan: Discussed healthy diet and exercise guidelines         Bernardino Vanessa MD  [facility]        "

## 2019-08-23 NOTE — NURSING NOTE
"/75 (BP Location: Left arm, Patient Position: Sitting, Cuff Size: Adult Large)   Pulse 97   Temp 98.3  F (36.8  C) (Oral)   Resp 18   Ht 1.753 m (5' 9\")   Wt 123.8 kg (272 lb 14.4 oz)   SpO2 99%   BMI 40.30 kg/m    Patient here for blood pressure follow up.  "

## 2019-08-25 PROBLEM — I10 ESSENTIAL HYPERTENSION: Status: ACTIVE | Noted: 2019-08-25

## 2019-09-04 ENCOUNTER — TELEPHONE (OUTPATIENT)
Dept: INTERNAL MEDICINE | Facility: CLINIC | Age: 34
End: 2019-09-04

## 2019-09-04 NOTE — LETTER
Jareth Chaparro  [address] [facility] [address]    :  1985    Dear Jareth,    This letter is to report the test results from you most recent visit.    The results are satisfactory unless described below.     Results for orders placed or performed in visit on 19   CBC with platelets   Result Value Ref Range    WBC 11.6 (H) 4.0 - 11.0 10e9/L    RBC Count 5.03 4.4 - 5.9 10e12/L    Hemoglobin 14.5 13.3 - 17.7 g/dL    Hematocrit 45.9 40.0 - 53.0 %    MCV 91 78 - 100 fl    MCH 28.8 26.5 - 33.0 pg    MCHC 31.6 31.5 - 36.5 g/dL    RDW 14.9 10.0 - 15.0 %    Platelet Count Platelets clumped, platelet count unavailable 150 - 450 10e9/L   ALT   Result Value Ref Range    ALT 92 (H) 0 - 70 U/L   AST   Result Value Ref Range    AST 53 (H) 0 - 45 U/L   Hemoglobin A1c   Result Value Ref Range    Hemoglobin A1C 6.6 (H) 0 - 5.6 %     The Hemoglobin A1C is Consistent with Diabetes. Please make an appointment to discuss the lab results and medication options.     Sincerely,         Dr Jimbo Vanessa

## 2019-09-04 NOTE — TELEPHONE ENCOUNTER
I have sent patient my chart note about his results but I am not sure if he has read it please inform patient below.    Notes recorded by Bernardino Vanessa MD on 8/25/2019 at 3:19 PM CDT  A1c 6.6- this is consistent with Diabetes. Please make appointment to discuss all lab results and medication options.

## 2019-09-04 NOTE — TELEPHONE ENCOUNTER
Attempted to contact pt. Left message to call clinic or to read his [employer] message.     Keep open to make sure he schedules.

## 2019-09-05 ENCOUNTER — OFFICE VISIT (OUTPATIENT)
Dept: SLEEP MEDICINE | Facility: CLINIC | Age: 34
End: 2019-09-05
Attending: INTERNAL MEDICINE
Payer: COMMERCIAL

## 2019-09-05 VITALS
RESPIRATION RATE: 16 BRPM | WEIGHT: 273 LBS | BODY MASS INDEX: 40.43 KG/M2 | HEIGHT: 69 IN | DIASTOLIC BLOOD PRESSURE: 80 MMHG | SYSTOLIC BLOOD PRESSURE: 135 MMHG | HEART RATE: 84 BPM | OXYGEN SATURATION: 94 %

## 2019-09-05 DIAGNOSIS — G25.81 RESTLESS LEGS SYNDROME (RLS): ICD-10-CM

## 2019-09-05 DIAGNOSIS — I10 ESSENTIAL HYPERTENSION: Primary | ICD-10-CM

## 2019-09-05 DIAGNOSIS — R06.83 SNORING: ICD-10-CM

## 2019-09-05 PROBLEM — E11.9 DIABETES MELLITUS, TYPE 2 (H): Status: ACTIVE | Noted: 2019-09-05

## 2019-09-05 PROCEDURE — 99244 OFF/OP CNSLTJ NEW/EST MOD 40: CPT | Performed by: PHYSICIAN ASSISTANT

## 2019-09-05 ASSESSMENT — MIFFLIN-ST. JEOR: SCORE: 2173.95

## 2019-09-05 NOTE — PROGRESS NOTES
Sleep Consultation:    Date on this visit: 9/5/2019    Jareth Chaparro is sent by Bernardino Vanessa for a sleep consultation regarding apnea.    Primary Physician: Bernardino Vanessa     Jareth Chaparro reports loud snoring, observed pauses in breathing and morning headaches for at least 7 years. His medical history is significant for HTN, morbid obesity, depression (resolved), DM 2.     Ramos goes to sleep at 10:00 PM during the week. He wakes up at 5:00 AM with an alarm. He falls asleep in 15 minutes.  Jareth denies difficulty falling asleep.  He wakes up 2-3 times a night for 5 minutes before falling back to sleep.  Jareth wakes up to go to the bathroom.  On weekends, Jareth goes to sleep at 12:00 AM.  He wakes up at 11:00 AM without an alarm. He falls asleep in 15 minutes.  Patient gets an average of 6-7 hours of sleep per week night and 8-9 hours on weekends.     Patient does use electronics in bed and does not watch TV in bed, worry in bed about money (lately) and read in bed.     Ramos does not do shift work.  He works day shifts 6 AM to 4:30 PM Sunday to Wednesday.  He works at [employer]. He lives with his wife and a teenage step daughter (14).    Jareth does snore every night and snoring is loud. Patient does have a regular bed partner.  He does have witnessed apneas, he assumes nightly. They never sleep separately due to his snoring.  Patient sleeps on his back (mostly) and side. He wakes with headaches about every other month. He has occasional morning dry mouth and restless legs (1-2 times per week, can interfere with sleep), denies snort arousals and morning confusion. His mother and brother both use CPAP. Jareth has rare sleep talking and denies any bruxism, sleep walking, dream enactment, sleep paralysis, cataplexy and hypnogogic/hypnopompic hallucinations.    Jareth denies difficulty breathing through his nose, claustrophobia, reflux at night, heartburn and depression.      Jareth has gained 40-45  pounds in the last 3 years.  Patient describes themself as a night person.  He would prefer to go to sleep at 12:00 AM and wake up at 10:30 AM.  Patient's [place] Sleepiness score 14/24 consistent with moderate daytime sleepiness.      Jareth naps 1-2 times per week for  minutes, feels refreshed after naps. He naps after work. He takes some inadvertant naps if he has not slept enough. It may happen if watching a movie.  He may get drowsy on long road trips, but denies sleepiness while driving around town. Patient was counseled on the importance of driving while alert, to pull over if drowsy, or nap before getting into the vehicle if sleepy.  He uses 1 cups/day of coffee, 2 sodas/day. Last caffeine intake is usually before noon.    Allergies:    Allergies   Allergen Reactions     No Known Drug Allergies        Medications:    Current Outpatient Medications   Medication Sig Dispense Refill     lisinopril (PRINIVIL/ZESTRIL) 20 MG tablet Take 1 tablet (20 mg) by mouth daily 30 tablet 3       Problem List:  Patient Active Problem List    Diagnosis Date Noted     Diabetes mellitus, type 2 (H) 09/05/2019     Priority: Medium     Essential hypertension 08/25/2019     Priority: Medium     Mild major depression (H)      Priority: Medium     Morbid obesity (H) 07/12/2019     Priority: Medium     Backache 12/20/2003     Priority: Medium     Problem list name updated by automated process. Provider to review       Depressive disorder, not elsewhere classified 12/20/2003     Priority: Medium        Past Medical/Surgical History:  Past Medical History:   Diagnosis Date     Juvenile osteochondrosis of spine     chronic LBP     Mild major depression (H)      Other and unspecified disc disorder of unspecified region      Past Surgical History:   Procedure Laterality Date     TONSILLECTOMY         Social History:  Social History     Socioeconomic History     Marital status:      Spouse name: Not on file     Number of  children: Not on file     Years of education: Not on file     Highest education level: Not on file   Occupational History     Not on file   Social Needs     Financial resource strain: Not on file     Food insecurity:     Worry: Not on file     Inability: Not on file     Transportation needs:     Medical: Not on file     Non-medical: Not on file   Tobacco Use     Smoking status: Light Tobacco Smoker     Types: Cigars     Smokeless tobacco: Never Used     Tobacco comment: 2 times a month   Substance and Sexual Activity     Alcohol use: Yes     Comment: socially, 1-2 times per month      Drug use: No     Sexual activity: Yes     Partners: Female   Lifestyle     Physical activity:     Days per week: Not on file     Minutes per session: Not on file     Stress: Not on file   Relationships     Social connections:     Talks on phone: Not on file     Gets together: Not on file     Attends Voodoo service: Not on file     Active member of club or organization: Not on file     Attends meetings of clubs or organizations: Not on file     Relationship status: Not on file     Intimate partner violence:     Fear of current or ex partner: Not on file     Emotionally abused: Not on file     Physically abused: Not on file     Forced sexual activity: Not on file   Other Topics Concern     Parent/sibling w/ CABG, MI or angioplasty before 65F 55M? Not Asked   Social History Narrative     Not on file       Family History:  Family History   Problem Relation Age of Onset     Sleep Apnea Mother      Other Cancer Father      Multiple myeloma Father      Lung Cancer Maternal Grandmother      Lung Cancer Maternal Grandfather      Sleep Apnea Brother        Review of Systems:  A complete review of systems reviewed by me is negative with the exeption of what has been mentioned in the history of present illness.  CONSTITUTIONAL: NEGATIVE for weight gain/loss, fever, chills, sweats or night sweats, drug allergies.  EYES: NEGATIVE for changes in  "vision, blind spots, double vision.  ENT: NEGATIVE for ear pain, sinus pain, post-nasal drip, runny nose, bloody nose  ENT:  POSITIVE for  sore throat  CARDIAC: NEGATIVE for fast heartbeats or fluttering in chest, chest pain or pressure, breathlessness when lying flat, swollen legs or swollen feet.  NEUROLOGIC: NEGATIVE headaches, weakness or numbness in the arms or legs.  DERMATOLOGIC: NEGATIVE for rashes, new moles or change in mole(s)  PULMONARY: NEGATIVE SOB at rest, productive cough, coughing up blood, whistling when breathing.    PULMONARY:  POSITIVE for  SOB with activity, dry cough and wheezing   GASTROINTESTINAL: NEGATIVE for nausea or vomitting, loose or watery stools, fat or grease in stools, constipation, abdominal pain, bowel movements black in color or blood noted.  GENITOURINARY: NEGATIVE for pain during urination, blood in urine, urinating more frequently than usual, irregular menstrual periods.  MUSCULOSKELETAL: NEGATIVE for muscle pain, bone or joint pain, swollen joints.  ENDOCRINE: NEGATIVE for increased thirst or urination, diabetes.  LYMPHATIC: NEGATIVE for swollen lymph nodes, lumps or bumps in the breasts or nipple discharge.    Physical Examination:  Vitals: /80   Pulse 84   Resp 16   Ht 1.753 m (5' 9.02\")   Wt 123.8 kg (273 lb)   SpO2 94%   BMI 40.30 kg/m      Neck Cir (cm): 51 cm    [place] Total Score 9/5/2019   Total score - [place] 14            GENERAL APPEARANCE: healthy, alert, no distress and cooperative  EYES: Eyes grossly normal to inspection, PERRL, conjunctivae and sclerae normal and lids and lashes normal  HENT: nose and mouth without ulcers or lesions, oral mucous membranes moist and oropharynx clear  NECK: no adenopathy, no asymmetry, masses, or scars, thyroid normal to palpation and trachea midline and normal to palpation  RESP: lungs clear to auscultation - no rales, rhonchi or wheezes  CV: regular rates and rhythm, normal S1 S2, no S3 or S4, no murmur, click or " rub and no irregular beats  LYMPHATICS: no cervical adenopathy  MS: extremities normal- no gross deformities noted  NEURO: Normal strength and tone, mentation intact, speech normal and cranial nerves 2-12 intact  Mallampati Class: I.  Tonsillar Stage: 0  surgically removed.    Impression/Plan:    (R06.83) Snoring  (primary encounter diagnosis), (I10) Essential hypertension, (Z68.41) BMI 40.0-44.9, adult (H)   Comment: Ramos presents with concerns of sleep apnea. His risk is very high. He snores loudly every night. He has snored for at least 7 years. His wife observes pauses in breathing nightly. His mother and brother use CPAP. He has gained about 40 pounds in the last few years. STOP BANG TOTAL: 7 snoring, tired (ESS 14/24), observed apnea, HTN, BMI >35 (40), neck >40 cm (51 cm), male. His only negative risk factor is age <50 (34)  Plan: HST-Home Sleep Apnea Test            (G25.81) Restless legs syndrome (RLS)  Comment: symptoms 1-2 times per week.  Plan: We will re-evaluate after his study. I would guess his symptoms        Literature provided regarding sleep apnea.      He will follow up with me in approximately two weeks after his sleep study has been competed to review the results and discuss plan of care.       Polysomnography reviewed.  Obstructive sleep apnea reviewed.  Complications of untreated sleep apnea were reviewed.  45 minutes was spent during this visit, over 50% in counseling and coordination of care.   Bennett Goltz, PA-C    CC: Bernardino Vanessa

## 2019-09-05 NOTE — PATIENT INSTRUCTIONS
"MY TREATMENT INFORMATION FOR SLEEP APNEA-  Jareth Chaparro    DOCTOR : Bennett Ezra Goltz, PA-C  SLEEP CENTER : [facility]     MY CONTACT NUMBER: [phone]    Am I having a sleep study at a sleep center?  Make sure you have an appointment for the study before you leave!    Am I having a home sleep study?  Watch this video:  [email or web address]  Please verify your insurance coverage with your insurance carrier    Frequently asked questions:  1. What is Obstructive Sleep Apnea (MANUEL)? MANUEL is the most common type of sleep apnea. Apnea means, \"without breath.\"  Apnea is most often caused by narrowing or collapse of the upper airway as muscles relax during sleep.   Almost everyone has occasional apneas. Most people with sleep apnea have had brief interruptions at night frequently for many years.  The severity of sleep apnea is related to how frequent and severe the events are.   2. What are the consequences of MANUEL? Symptoms include: feeling sleepy during the day, snoring loudly, gasping or stopping of breathing, trouble sleeping, and occasionally morning headaches or heartburn at night.  Sleepiness can be serious and even increase the risk of falling asleep while driving. Other health consequences may include development of high blood pressure and other cardiovascular disease in persons who are susceptible. Untreated MANUEL  can contribute to heart disease, stroke and diabetes.   3. What are the treatment options? In most situations, sleep apnea is a lifelong disease that must be managed with daily therapy. Medications are not effective for sleep apnea and surgery is generally not considered until other therapies have been tried. Your treatment is your choice . Continuous Positive Airway (CPAP) works right away and is the therapy that is effective in nearly everyone. An oral device to hold your jaw forward is usually the next most reliable option. Other " options include postioning devices (to keep you off your back), weight loss, and surgery including a tongue pacing device. There is more detail about some of these options below.    Important tips for using CPAP and similar devices   Know your equipment:  CPAP is continuous positive airway pressure that prevents obstructive sleep apnea by keeping the throat from collapsing while you are sleeping. In most cases, the device is  smart  and can slowly self-adjusts if your throat collapses and keeps a record every day of how well you are treated-this information is available to you and your care team.  BPAP is bilevel positive airway pressure that keeps your throat open and also assists each breath with a pressure boost to maintain adequate breathing.  Special kinds of BPAP are used in patients who have inadequate breathing from lung or heart disease. In most cases, the device is  smart  and can slowly self-adjusts to assist breathing. Like CPAP, the device keeps a record of how well you are treated.  Your mask is your connection to the device. You get to choose what feels most comfortable and the staff will help to make sure if fits. Here: are some examples of the different masks that are available:       Key points to remember on your journey with sleep apnea:  1. Sleep study.  PAP devices often need to be adjusted during a sleep study to show that they are effective and adjusted right.  2. Good tips to remember: Try wearing just the mask during a quiet time during the day so your body adapts to wearing it. A humidifier is recommended for comfort in most cases to prevent drying of your nose and throat. Allergy medication from your provider may help you if you are having nasal congestion.  3. Getting settled-in. It takes more than one night for most of us to get used to wearing a mask. Try wearing just the mask during a quiet time during the day so your body adapts to wearing it. A humidifier is recommended for comfort  in most cases. Our team will work with you carefully on the first day and will be in contact within 4 days and again at 2 and 4 weeks for advice and remote device adjustments. Your therapy is evaluated by the device each day.   4. Use it every night. The more you are able to sleep naturally for 7-8 hours, the more likely you will have good sleep and to prevent health risks or symptoms from sleep apnea. Even if you use it 4 hours it helps. Occasionally all of us are unable to use a medical therapy, in sleep apnea, it is not dangerous to miss one night.   5. Communicate. Call our skilled team on the number provided on the first day if your visit for problems that make it difficult to wear the device. Over 2 out of 3 patients can learn to wear the device long-term with help from our team. Remember to call our team or your sleep providers if you are unable to wear the device as we may have other solutions for those who cannot adapt to mask CPAP therapy. It is recommended that you sleep your sleep provider within the first 3 months and yearly after that if you are not having problems.   6. Use it for your health. We encourage use of CPAP masks during daytime quiet periods to allow your face and brain to adapt to the sensation of CPAP so that it will be a more natural sensation to awaken to at night or during naps. This can be very useful during the first few weeks or months of adapting to CPAP though it does not help medically to wear CPAP during wakefulness and  should not be used as a strategy just to meet guidelines.  7. Take care of your equipment. Make sure you clean your mask and tubing using directions every day and that your filter and mask are replaced as recommended or if they are not working.     BESIDES CPAP, WHAT OTHER THERAPIES ARE THERE?    Positioning Device  Positioning devices are generally used when sleep apnea is mild and only occurs on your back.This example shows a pillow that straps around the  waist. It may be appropriate for those whose sleep study shows milder sleep apnea that occurs primarily when lying flat on one's back. Preliminary studies have shown benefit but effectiveness at home may need to be verified by a home sleep test. These devices are generally not covered by medical insurance.  Examples of devices that maintain sleeping on the back to prevent snoring and mild sleep apnea.    Belt type body positioner  Http://iRewind.Backpack/    Electronic reminder  Http://nightshifttherapy.com/  Http://www.[employer]Backpack.au/      Oral Appliance  What is oral appliance therapy?  An oral appliance device fits on your teeth at night like a retainer used after having braces. The device is made by a specialized dentist and requires several visits over 1-2 months before a manufactured device is made to fit your teeth and is adjusted to prevent your sleep apnea. Once an oral device is working properly, snoring should be improved. A home sleep test may be recommended at that time if to determine whether the sleep apnea is adequately treated.       Some things to remember:  -Oral devices are often, but not always, covered by your medical insurance. Be sure to check with your insurance provider.   -If you are referred for oral therapy, you will be given a list of specialized dentists to consider or you may choose to visit the Web site of the American Academy of Dental Sleep Medicine  -Oral devices are less likely to work if you have severe sleep apnea or are extremely overweight.     More detailed information  An oral appliance is a small acrylic device that fits over the upper and lower teeth  (similar to a retainer or a mouth guard). This device slightly moves jaw forward, which moves the base of the tongue forward, opens the airway, improves breathing for effective treat snoring and obstructive sleep apnea in perhaps 7 out of 10 people .  The best working devices are custom-made by a dental device  after  a mold is made of the teeth 1, 2, 3.  When is an oral appliance indicated?  Oral appliance therapy is recommended as a first-line treatment for patients with primary snoring, mild sleep apnea, and for patients with moderate sleep apnea who prefer appliance therapy to use of CPAP4, 5. Severity of sleep apnea is determined by sleep testing and is based on the number of respiratory events per hour of sleep.   How successful is oral appliance therapy?  The success rate of oral appliance therapy in patients with mild sleep apnea is 75-80% while in patients with moderate sleep apnea it is 50-70%. The chance of success in patients with severe sleep apnea is 40-50%. The research also shows that oral appliances have a beneficial effect on the cardiovascular health of MANUEL patients at the same magnitude as CPAP therapy7.  Oral appliances should be a second-line treatment in cases of severe sleep apnea, but if not completely successful then a combination therapy utilizing CPAP plus oral appliance therapy may be effective. Oral appliances tend to be effective in a broad range of patients although studies show that the patients who have the highest success are females, younger patients, those with milder disease, and less severe obesity. 3, 6.   Finding a dentist that practices dental sleep medicine  Specific training is available through the American Academy of Dental Sleep Medicine for dentists interested in working in the field of sleep. To find a dentist who is educated in the field of sleep and the use of oral appliances, near you, visit the Web site of the American Academy of Dental Sleep Medicine.    References  1. Aleksandr, et al. Objectively measured vs self-reported compliance during oral appliance therapy for sleep-disordered breathing. Chest 2013; 144(5): 1222-9754.  2. Ananda et al. Objective measurement of compliance during oral appliance therapy for sleep-disordered breathing. Thorax 2013; 68(1): 91-96.  3.  Olivia et al. Mandibular advancement devices in 620 men and women with MANUEL and snoring: tolerability and predictors of treatment success. Chest 2004; 125: 4110-1991.  4. Manuel et al. Oral appliances for snoring and MANUEL: a review. Sleep 2006; 29: 244-262.  5. Lisbet et al. Oral appliance treatment for MANUEL: an update. J Clin Sleep Med 2014; 10(2): 215-227.  6. Familia et al. Predictors of OSAH treatment outcome. J Dent Res 2007; 86: 5247-6026.  Weight Loss:    Weight loss is a long-term strategy that may improve sleep apnea in some patients.    Weight management is a personal decision and the decision should be based on your interest and the potential benefits.  If you are interested in exploring weight loss strategies, the following discussion covers the impact on weight loss on sleep apnea and the approaches that may be successful.    Being overweight does not necessarily mean you will have health consequences.  Those who have BMI over 35 or over 27 with existing medical conditions carries greater risk.   Weight loss decreases severity of sleep apnea in most people with obesity. For those with mild obesity who have developed snoring with weight gain, even 15-30 pound weight loss can improve and occasionally eliminate sleep apnea.  Structured and life-long dietary and health habits are necessary to lose weight and keep healthier weight levels.     Though there may be significant health benefits from weight loss, long-term weight loss is very difficult to achieve- studies show success with dietary management in less than 10% of people. In addition, substantial weight loss may require years of dietary control and may be difficult if patients have severe obesity. In these cases, surgical management may be considered.  Finally, older individuals who have tolerated obesity without health complications may be less likely to benefit from weight loss strategies.      Your BMI is Body mass index is 40.3  kg/m .  Weight management is a personal decision.  If you are interested in exploring weight loss strategies, the following discussion covers the approaches that may be successful. Body mass index (BMI) is one way to tell whether you are at a healthy weight, overweight, or obese. It measures your weight in relation to your height.  A BMI of 18.5 to 24.9 is in the healthy range. A person with a BMI of 25 to 29.9 is considered overweight, and someone with a BMI of 30 or greater is considered obese. More than two-thirds of American adults are considered overweight or obese.  Being overweight or obese increases the risk for further weight gain. Excess weight may lead to heart disease and diabetes.  Creating and following plans for healthy eating and physical activity may help you improve your health.  Weight control is part of healthy lifestyle and includes exercise, emotional health, and healthy eating habits. Careful eating habits lifelong are the mainstay of weight control. Though there are significant health benefits from weight loss, long-term weight loss with diet alone may be very difficult to achieve- studies show long-term success with dietary management in less than 10% of people. Attaining a healthy weight may be especially difficult to achieve in those with severe obesity. In some cases, medications, devices and surgical management might be considered.  What can you do?  If you are overweight or obese and are interested in methods for weight loss, you should discuss this with your provider.     Consider reducing daily calorie intake by 500 calories.     Keep a food journal.     Avoiding skipping meals, consider cutting portions instead.    Diet combined with exercise helps maintain muscle while optimizing fat loss. Strength training is particularly important for building and maintaining muscle mass. Exercise helps reduce stress, increase energy, and improves fitness. Increasing exercise without diet control,  however, may not burn enough calories to loose weight.       Start walking three days a week 10-20 minutes at a time    Work towards walking thirty minutes five days a week     Eventually, increase the speed of your walking for 1-2 minutes at time    In addition, we recommend that you review healthy lifestyles and methods for weight loss available through the National Institutes of Health patient information sites:  http://win.niddk.nih.gov/publications/index.htm    And look into health and wellness programs that may be available through your health insurance provider, employer, local community center, or ozzy club.    Weight management plan: Patient was referred to their PCP to discuss a diet and exercise plan.    Surgery:  Surgery for obstructive sleep apnea is considered generally only when other therapies fail to work. Surgery may be discussed with you if you are having a difficult time tolerating CPAP and or when there is an abnormal structure that requires surgical correction.  Nose and throat surgeries often enlarge the airway to prevent collapse.  Most of these surgeries create pain for 1-2 weeks and up to half of the most common surgeries are not effective throughout life.  You should carefully discuss the benefits and drawbacks to surgery with your sleep provider and surgeon to determine if it is the best solution for you.   More information  Surgery for MANUEL is directed at areas that are responsible for narrowing or complete obstruction of the airway during sleep.  There are a wide range of procedures available to enlarge and/or stabilize the airway to prevent blockage of breathing in the three major areas where it can occur: the palate, tongue, and nasal regions.  Successful surgical treatment depends on the accurate identification of the factors responsible for obstructive sleep apnea in each person.  A personalized approach is required because there is no single treatment that works well for  everyone.  Because of anatomic variation, consultation with an examination by a sleep surgeon is a critical first step in determining what surgical options are best for each patient.  In some cases, examination during sedation may be recommended in order to guide the selection of procedures.  Patients will be counseled about risks and benefits as well as the typical recovery course after surgery. Surgery is typically not a cure for a person s MANUEL.  However, surgery will often significantly improve one s MANUEL severity (termed  success rate ).  Even in the absence of a cure, surgery will decrease the cardiovascular risk associated with OSA7; improve overall quality of life8 (sleepiness, functionality, sleep quality, etc).  Palate Procedures:  Patients with MANUEL often have narrowing of their airway in the region of their tonsils and uvula.  The goals of palate procedures are to widen the airway in this region as well as to help the tissues resist collapse.  Modern palate procedure techniques focus on tissue conservation and soft tissue rearrangement, rather than tissue removal.  Often the uvula is preserved in this procedure. Residual sleep apnea is common in patient after pharyngoplasty with an average reduction in sleep apnea events of 33%2.    Tongue Procedures:  ExamWhile patients are awake, the muscles that surround the throat are active and keep this region open for breathing. These muscles relax during sleep, allowing the tongue and other structures to collapse and block breathing.  There are several different tongue procedures available.  Selection of a tongue base procedure depends on characteristics seen on physical exam.  Generally, procedures are aimed at removing bulky tissues in this area or preventing the back of the tongue from falling back during sleep.  Success rates for tongue surgery range from 50-62%3.  Hypoglossal Nerve Stimulation:  Hypoglossal nerve stimulation has recently received approval from  the [place] Food and Drug Administration for the treatment of obstructive sleep apnea.  This is based on research showing that the system was safe and effective in treating sleep apnea6.  Results showed that the median AHI score decreased 68%, from 29.3 to 9.0. This therapy uses an implant system that senses breathing patterns and delivers mild stimulation to airway muscles, which keeps the airway open during sleep.  The system consists of three fully implanted components: a small generator (similar in size to a pacemaker), a breathing sensor, and a stimulation lead.  Using a small handheld remote, a patient turns the therapy on before bed and off upon awakening.    Candidates for this device must be greater than 22 years of age, have moderate to severe MANUEL (AHI between 20-65), BMI less than 32, have tried CPAP/oral appliance without success, and have appropriate upper airway anatomy (determined by a sleep endoscopy performed by Dr. Buckner).  Hypoglossal Nerve Stimulation Pathway:    The sleep surgeon s office will work with the patient through the insurance prior-authorization process (including communications and appeals).    Nasal Procedures:  Nasal obstruction can interfere with nasal breathing during the day and night.  Studies have shown that relief of nasal obstruction can improve the ability of some patients to tolerate positive airway pressure therapy for obstructive sleep apnea1.  Treatment options include medications such as nasal saline, topical corticosteroid and antihistamine sprays, and oral medications such as antihistamines or decongestants. Non-surgical treatments can include external nasal dilators for selected patients. If these are not successful by themselves, surgery can improve the nasal airway either alone or in combination with these other options.  Combination Procedures:  Combination of surgical procedures and other treatments may be recommended, particularly if patients have more  than one area of narrowing or persistent positional disease.  The success rate of combination surgery ranges from 66-80%2,3.    References  1. Ofelia NICHOLE. The Role of the Nose in Snoring and Obstructive Sleep Apnoea: An Update.  Eur Arch Otorhinolaryngol. 2011; 268: 1365-73.  2.  Vijay SM; Guevara JA; Will JR; Pallanch JF; Fredy MB; Bret SG; Marisol ROMAN. Surgical modifications of the upper airway for obstructive sleep apnea in adults: a systematic review and meta-analysis. SLEEP 2010;33(10):3989-1194. Chevy CARVER. Hypopharyngeal surgery in obstructive sleep apnea: an evidence-based medicine review.  Arch Otolaryngol Head Neck Surg. 2006 Feb;132(2):206-13.  3. Mj YH1, Jose Y, Sohail BRIA. The efficacy of anatomically based multilevel surgery for obstructive sleep apnea. Otolaryngol Head Neck Surg. 2003 Oct;129(4):327-35.  4. Chevy CARVER, Goldberg A. Hypopharyngeal Surgery in Obstructive Sleep Apnea: An Evidence-Based Medicine Review. Arch Otolaryngol Head Neck Surg. 2006 Feb;132(2):206-13.  5. Son PJ et al. Upper-Airway Stimulation for Obstructive Sleep Apnea.  N Engl J Med. 2014 Jan 9;370(2):139-49.  6. Steve Y et al. Increased Incidence of Cardiovascular Disease in Middle-aged Men with Obstructive Sleep Apnea. Am J Respir Crit Care Med; 2002 166: 159-165  7. Alessandra MARIN et al. Studying Life Effects and Effectiveness of Palatopharyngoplasty (SLEEP) study: Subjective Outcomes of Isolated Uvulopalatopharyngoplasty. Otolaryngol Head Neck Surg. 2011; 144: 623-631.

## 2019-09-05 NOTE — NURSING NOTE
"Chief Complaint   Patient presents with     Sleep Problem     Loud snoring, witnessed apnea, morning headaches       Initial /80   Pulse 84   Resp 16   Ht 1.753 m (5' 9.02\")   Wt 123.8 kg (273 lb)   SpO2 94%   BMI 40.30 kg/m   Estimated body mass index is 40.3 kg/m  as calculated from the following:    Height as of this encounter: 1.753 m (5' 9.02\").    Weight as of this encounter: 123.8 kg (273 lb).    Medication Reconciliation: complete    Neck circumference: 20 inches / 51 centimeters.    ESS 12  Supriya Sawyer MA      "

## 2019-09-06 ENCOUNTER — TELEPHONE (OUTPATIENT)
Dept: INTERNAL MEDICINE | Facility: CLINIC | Age: 34
End: 2019-09-06

## 2019-09-06 NOTE — TELEPHONE ENCOUNTER
Patient dropped off FMLA forms to be updated with a return to work date. Completed by Dr. Kovacs, left at the , patient notified.

## 2019-09-06 NOTE — TELEPHONE ENCOUNTER
Name of person picking up: Jareth     If not patient, relationship to patient: self    Type of identification: MAURO WADE #: [id number]     What was picked up: forms

## 2019-09-10 NOTE — TELEPHONE ENCOUNTER
Patient reviewed results on 9-5-2019 but no follow up appointment on file.  Patient's home/cell number message on machine to call back.

## 2019-09-12 ENCOUNTER — OFFICE VISIT (OUTPATIENT)
Dept: SLEEP MEDICINE | Facility: CLINIC | Age: 34
End: 2019-09-12
Attending: PHYSICIAN ASSISTANT
Payer: COMMERCIAL

## 2019-09-12 DIAGNOSIS — R06.83 SNORING: ICD-10-CM

## 2019-09-12 DIAGNOSIS — G47.34 HYPOXIA, SLEEP RELATED: ICD-10-CM

## 2019-09-12 DIAGNOSIS — G47.33 OSA (OBSTRUCTIVE SLEEP APNEA): ICD-10-CM

## 2019-09-12 DIAGNOSIS — I10 ESSENTIAL HYPERTENSION: ICD-10-CM

## 2019-09-13 ENCOUNTER — TELEPHONE (OUTPATIENT)
Dept: SLEEP MEDICINE | Facility: CLINIC | Age: 34
End: 2019-09-13

## 2019-09-13 ENCOUNTER — DOCUMENTATION ONLY (OUTPATIENT)
Dept: SLEEP MEDICINE | Facility: CLINIC | Age: 34
End: 2019-09-13
Payer: COMMERCIAL

## 2019-09-13 DIAGNOSIS — G47.33 OSA (OBSTRUCTIVE SLEEP APNEA): Primary | ICD-10-CM

## 2019-09-13 DIAGNOSIS — G47.34 HYPOXIA, SLEEP RELATED: ICD-10-CM

## 2019-09-13 PROCEDURE — G0399 HOME SLEEP TEST/TYPE 3 PORTA: HCPCS | Performed by: INTERNAL MEDICINE

## 2019-09-13 NOTE — PROCEDURES
"HOME SLEEP STUDY INTERPRETATION    Patient: Jareth Chaparro  MRN: [id number]  YOB: 1985  Study Date: 2019  Referring Provider: Bernardino Vanessa;   Ordering Provider: Bennett Goltz, PA     Indications for Home Study: Jareth Chaparro is a 34 year old male with a history of HTN, diabetes, obesity who presents with symptoms suggestive of obstructive sleep apnea.    Estimated body mass index is 40.3 kg/m  as calculated from the following:    Height as of 19: 1.753 m (5' 9.02\").    Weight as of 19: 123.8 kg (273 lb).  Total score - [place]: 14 (2019  8:28 AM)  STOP-BAN/8    Data: A full night home sleep study was performed recording the standard physiologic parameters including body position, movement, sound, nasal pressure, thermal oral airflow, chest and abdominal movements with respiratory inductance plethysmography, and oxygen saturation by pulse oximetry. Pulse rate was estimated by oximetry recording. This study was considered adequate based on > 4 hours of quality oximetry and respiratory recording. As specified by the AASM Manual for the Scoring of Sleep and Associated events, version 2.3, Rule VIII.D 1B, 4% oxygen desaturation scoring for hypopneas is used as a standard of care on all home sleep apnea testing.    Analysis Time:  419 minutes    Respiration:   Sleep Associated Hypoxemia: sustained hypoxemia was present. Baseline oxygen saturation was 91.2%.  Time with saturation less than or equal to 88% was 259.4 minutes. The lowest oxygen saturation was 51%.   Snoring: Snoring was present.  Respiratory events: The home study revealed a presence of 611 obstructive apneas and 208 mixed and central apneas. There were 117 hypopneas resulting in a combined apnea/hypopnea index [AHI] of 134 events per hour.  AHI was 135.6 per hour supine, - per hour prone, - per hour on left side, and 123.5 per hour on right side.   Pattern: Excluding events noted above, respiratory rate and " pattern was Normal.    Position: Percent of time spent: supine - 87%, prone - 0%, on left - 0%, on right - 13%.    Heart Rate: By pulse oximetry normal rate was noted.     Assessment:   Severe obstructive sleep apnea.  Sleep associated hypoxemia was present.    Recommendations:  Considering severe sleep apnea and hypoxemia, recommend titration PSG with CO2 monitoring to titrate appropriate PAP therapy.   Weight management.    Diagnosis Code(s): Obstructive Sleep Apnea G47.33, Hypoxemia G47.36    Dell Boucher MD, MD, September 13, 2019   Diplomate, American Board of Psychiatry and Neurology, Sleep Medicine

## 2019-09-13 NOTE — TELEPHONE ENCOUNTER
I called Jareth to review his home sleep test results with him as this showed very severe sleep apnea and hypoxemia.  His AHI was 134/h.  His baseline SPO2 was 91.2% but his average SPO2 was 85.1%.  He had an O2 daya of 51% and 259.4 minutes below 88%.  His AHI was over 100/hr regardless of position. His O2 baseline dropped and remained below 90% at times, which likely relates to REM periods. I would like to get him started on treatment as soon as possible.   I left a message asking for a return call on Monday. I will also send him a MyChart note. I am placing a STAT order for auto CPAP 6-18 cm. I ordered oximetry on CPAP to assess for residual hypoxemia. If his oxygen remains low, I will recommend an in-lab titration study.  Bennett Goltz, PA-C

## 2019-09-13 NOTE — PROCEDURES
"HOME SLEEP STUDY INTERPRETATION    Patient: Jareth Chaparro  MRN: [id number]  YOB: 1985  Study Date: 2019  Referring Provider: Bernardino Vanessa;  Ordering Provider: Bennett Goltz, PA     Indications for Home Study: Jareth Chaparro is a 34 year old male with a history of hypertension, depression, diabetes and severe obesity who presents with symptoms suggestive of obstructive sleep apnea.    Estimated body mass index is 40.3 kg/m  as calculated from the following:    Height as of 19: 1.753 m (5' 9.02\").    Weight as of 19: 123.8 kg (273 lb).  Total score - [place]: 14 (2019  8:28 AM)  STOP-BAN/8    Data: A full night home sleep study was performed recording the standard physiologic parameters including body position, movement, sound, nasal pressure, thermal oral airflow, chest and abdominal movements with respiratory inductance plethysmography, and oxygen saturation by pulse oximetry. Pulse rate was estimated by oximetry recording. This study was considered adequate based on > 4 hours of quality oximetry and respiratory recording. As specified by the AASM Manual for the Scoring of Sleep and Associated events, version 2.3, Rule VIII.D 1B, 4% oxygen desaturation scoring for hypopneas is used as a standard of care on all home sleep apnea testing.    Analysis Time:  419 minutes    Respiration:   Sleep Associated Hypoxemia: sustained hypoxemia was present. Baseline oxygen saturation was 91%.  Time with saturation less than or equal to 88% was 259 minutes. The lowest oxygen saturation was 51%.   Snoring: Snoring was present.  Respiratory events: The home study revealed a presence of 611 obstructive apneas and 208 mixed and central apneas. There were 117 hypopneas resulting in a combined apnea/hypopnea index [AHI] of 134 events per hour.  AHI was 136 per hour supine, - per hour prone, - per hour on left side, and 124 per hour on right side.   Pattern: Excluding events noted " above, respiratory rate and pattern was Normal.    Position: Percent of time spent: supine - 87%, prone - -%, on left - -%, on right - 13%.    Heart Rate: By pulse oximetry normal rate was noted.     Assessment:   Severe obstructive sleep apnea.  Sleep associated hypoxemia was present.    Recommendations:  Consider auto-CPAP at 6-16 cmH2O with reevaluation of hypoxemia with overnight oximetry and enrollment in sleep therapy management.  Alternatively repeat study with titration and monitoring for CO2 could be considered.  Advise avoidance of drowsy driving.    Suggest optimizing sleep hygiene and avoiding sleep deprivation.  Weight management.    Diagnosis Code(s): Obstructive Sleep Apnea G47.33, Hypoxemia G47.36    HANNAH HUNT MD, September 13, 2019   Diplomate, American Board of Internal Medicine, Sleep Medicine

## 2019-09-13 NOTE — PROGRESS NOTES
This HSAT was performed using a Noxturnal T3 device which recorded snore, sound, movement activity, body position, nasal pressure, oronasal thermal airflow, pulse, oximetry and both chest and abdominal respiratory effort. HSAT data was restricted to the time patient states they were in bed.     HSAT was scored using 1B 4% hypopnea rule.     AHI: 134.0. Snoring was reported as loud.  Time with SpO2 below 89% was 259.4 minutes.   Overall signal quality was good     Pt will follow up with sleep provider to determine appropriate therapy.     Ordering MD, Goltz, Bennett Ezra, PA-C Charles O. BA, [facility], RST System Clinical Specialist 09/13/2019

## 2019-09-16 ENCOUNTER — TELEPHONE (OUTPATIENT)
Dept: SLEEP MEDICINE | Facility: CLINIC | Age: 34
End: 2019-09-16

## 2019-09-16 DIAGNOSIS — G47.33 OSA (OBSTRUCTIVE SLEEP APNEA): ICD-10-CM

## 2019-09-16 DIAGNOSIS — G47.34 HYPOXIA, SLEEP RELATED: ICD-10-CM

## 2019-09-25 ENCOUNTER — TELEPHONE (OUTPATIENT)
Dept: SLEEP MEDICINE | Facility: CLINIC | Age: 34
End: 2019-09-25

## 2019-09-25 NOTE — TELEPHONE ENCOUNTER
LVM for pt to call and reschedule appt with Emilio due to him being out of clinic.    Jackie CarltonAcuna  Sleep Clinic - Specialist

## 2019-09-27 ENCOUNTER — DOCUMENTATION ONLY (OUTPATIENT)
Dept: SLEEP MEDICINE | Facility: CLINIC | Age: 34
End: 2019-09-27
Payer: COMMERCIAL

## 2019-09-27 DIAGNOSIS — G47.33 OSA (OBSTRUCTIVE SLEEP APNEA): ICD-10-CM

## 2019-09-27 DIAGNOSIS — G47.33 OSA (OBSTRUCTIVE SLEEP APNEA): Primary | ICD-10-CM

## 2019-09-27 DIAGNOSIS — G47.34 HYPOXIA, SLEEP RELATED: ICD-10-CM

## 2019-09-27 NOTE — PROGRESS NOTES
Patient was offered choice of vendor and chose [facility].  Patient Jareth Chaparro was set up at [facility] on September 27, 2019. Patient received a [employer] DreamStation Auto. Pressures were set at 6-18 cm H2O.   Patient s ramp is 5 cm H2O for 45 min and FLEX/EPR is A Flex.  Patient will receive an [employer] F20 large in the mail. He  also got  heated tubing and heated humidifier.  Patient is enrolled in the STM Program and does need to meet compliance. Patient doesn't have a follow up scheduled.  Providers:[id number]}.    DINO ALTAMIRANO

## 2019-09-30 ENCOUNTER — DOCUMENTATION ONLY (OUTPATIENT)
Dept: SLEEP MEDICINE | Facility: CLINIC | Age: 34
End: 2019-09-30

## 2019-09-30 DIAGNOSIS — G47.34 HYPOXIA, SLEEP RELATED: ICD-10-CM

## 2019-09-30 DIAGNOSIS — G47.33 OSA (OBSTRUCTIVE SLEEP APNEA): ICD-10-CM

## 2019-09-30 NOTE — PROGRESS NOTES
3 DAY STM VISIT    Diagnostic AHI:   134.0 HST    Patient contacted for 3 day STM visit  Message left for patient to return call    Replacement device: No     Device type: Auto-CPAP  PAP settings from order::  CPAP min 6 cm  H20       CPAP max 18 cm  H20         Mask type:    Full Face Mask     Device settings from machine      Min CPAP 6            Max CPAP 18            Assessment: Nightly usage over four hours.  Action plan: Pt to have f/u 14 day [facility] visit.  Patient has a follow up visit scheduled:   no, but is required by insurance for compliance.    Total time spent on remote patient monitoring data analysis and patient contact today:   10 minutes

## 2019-10-01 ENCOUNTER — HEALTH MAINTENANCE LETTER (OUTPATIENT)
Age: 34
End: 2019-10-01

## 2019-10-14 ENCOUNTER — DOCUMENTATION ONLY (OUTPATIENT)
Dept: SLEEP MEDICINE | Facility: CLINIC | Age: 34
End: 2019-10-14

## 2019-10-14 DIAGNOSIS — G47.33 OSA (OBSTRUCTIVE SLEEP APNEA): ICD-10-CM

## 2019-10-14 DIAGNOSIS — G47.34 HYPOXIA, SLEEP RELATED: ICD-10-CM

## 2019-10-14 NOTE — PROGRESS NOTES
14 DAY STM VISIT    Diagnostic AHI:   134.0 HST      Message left for patient to return call     Assessment: Pt not meeting objective benchmarks for leak      Action plan: waiting for patient to return call.  and pt to have 30 day STM visit.    Device type: Auto-CPAP    PAP settings: CPAP min 6 cm  H20       CPAP max 18 cm  H20            90th % [id number] cm  H20    Mask type:  Full Face Mask    Objective measures: 14 day rolling measures         Compliance  85 %     % of night spent in large leak  14 % last  upload      AHI 4.82   last  upload      Average number of minutes 370          Objective measure goal  Compliance   Goal >70%  Leak   Goal < 10%  AHI  Goal < 5  Usage  Goal >240      Total time spent on remote patient monitoring data analysis and patient contact today:   10  minutes

## 2019-10-29 ENCOUNTER — DOCUMENTATION ONLY (OUTPATIENT)
Dept: SLEEP MEDICINE | Facility: CLINIC | Age: 34
End: 2019-10-29
Payer: COMMERCIAL

## 2019-10-29 DIAGNOSIS — G47.34 HYPOXIA, SLEEP RELATED: ICD-10-CM

## 2019-10-29 DIAGNOSIS — G47.33 OSA (OBSTRUCTIVE SLEEP APNEA): ICD-10-CM

## 2019-10-29 NOTE — PROGRESS NOTES
30 DAY STM VISIT    Diagnostic AHI:   134.0  HST      Subjective measures:   Pt reports that snoring is gone and he is feeling benefit from therapy.  He feels that the size is too big, however smaller size is too small.     Assessment: Pt not meeting objective benchmarks for leak Patient meeting subjective benchmarks.   Action plan:   Patient has not scheduled a follow up visit  Device type: Auto-CPAP  PAP settings: CPAP min 6 cm  H20     CPAP max 18 cm  H20          90th % [id number] cm  H20    Mask type:  Full Face Mask    Objective measures: 14 day rolling measures         Compliance  85 %     % of night spent in large leak  11 % last  upload      AHI 3.88   last  upload      Average number of minutes 360      Objective measure goal  Compliance   Goal >70%  Leak   Goal < 10%  AHI  Goal < 5  Usage  Goal >240      Total time spent on remote patient monitoring data analysis and patient contact today:   15 minutes      Total contact/remote patient monitoring for last 30 days:  45 min

## 2019-12-15 ENCOUNTER — HEALTH MAINTENANCE LETTER (OUTPATIENT)
Age: 34
End: 2019-12-15

## 2020-02-19 ENCOUNTER — TELEPHONE (OUTPATIENT)
Dept: INTERNAL MEDICINE | Facility: CLINIC | Age: 35
End: 2020-02-19

## 2020-02-19 NOTE — TELEPHONE ENCOUNTER
Panel Management Review      Patient has the following on his problem list:     Depression / Dysthymia review    Measure:  Needs PHQ-9 score of 4 or less during index window.  Administer PHQ-9 and if score is 5 or more, send encounter to provider for next steps.    5 - 7 month window range: 6    PHQ-9 SCORE 7/12/2019   PHQ-9 Total Score 6       If PHQ-9 recheck is 5 or more, route to provider for next steps.    Patient is due for:  None    Diabetes    ASA: unknown    Last A1C  Lab Results   Component Value Date    A1C 6.6 08/23/2019     A1C tested: MONITOR    Last LDL:    Lab Results   Component Value Date    CHOL 183 07/12/2019     Lab Results   Component Value Date    HDL 35 07/12/2019     Lab Results   Component Value Date     07/12/2019     Lab Results   Component Value Date    TRIG 206 07/12/2019     No results found for: CHOLHDLRATIO  Lab Results   Component Value Date    NHDL 148 07/12/2019       Is the patient on a Statin? NO             Is the patient on Aspirin? NO        Last three blood pressure readings:  BP Readings from Last 3 Encounters:   09/05/19 135/80   08/23/19 125/75   08/16/19 137/84       Date of last diabetes office visit: 8/23/19     Tobacco History:     History   Smoking Status     Light Tobacco Smoker     Types: Cigars   Smokeless Tobacco     Never Used     Comment: 2 times a month           Composite cancer screening  Chart review shows that this patient is due/due soon for the following None  Summary:    Patient is due/failing the following:   Diabetic eye exam    Action needed:   Patient needs office visit for diabetic eye exam.    Type of outreach:    Sent letter.    Questions for provider review:    None                                                                                                                                    Margarita Piper CMA       Chart routed to none .

## 2020-03-06 ENCOUNTER — TRANSFERRED RECORDS (OUTPATIENT)
Dept: HEALTH INFORMATION MANAGEMENT | Facility: CLINIC | Age: 35
End: 2020-03-06

## 2020-03-10 ENCOUNTER — DOCUMENTATION ONLY (OUTPATIENT)
Dept: CARE COORDINATION | Facility: CLINIC | Age: 35
End: 2020-03-10

## 2020-03-10 ENCOUNTER — TELEPHONE (OUTPATIENT)
Dept: UROLOGY | Facility: CLINIC | Age: 35
End: 2020-03-10

## 2020-03-10 DIAGNOSIS — Z31.41 FERTILITY TESTING: Primary | ICD-10-CM

## 2020-03-10 NOTE — TELEPHONE ENCOUNTER
Message left on the patient voicemail stating that I placed his SA order in his chart. Message states that the patient can call the Diagnostic And[facility] lab @ [phone] to schedule his lab appointment.      Franky Pelaez MA

## 2020-03-10 NOTE — TELEPHONE ENCOUNTER
[facility] Call Center    Phone Message    May a detailed message be left on voicemail: yes     Reason for Call: Order(s): Other:   Reason for requested: Pt has an infertility appointment scheduled on 3/27 with Dr. Mcintosh  Date needed: 3/11/2020, please call Jareth once the order has been placed so he can schedule his appointment  Provider name: Dr. Mcintosh      Action Taken: Message routed to:  [facility]  Urology    Travel Screening: Not Applicable

## 2020-03-11 NOTE — TELEPHONE ENCOUNTER
MEDICAL RECORDS REQUEST   [facility]  Urology Clinic  [address] [place] [address]  PHONE: [phone]  Fax: [phone]        FUTURE VISIT INFORMATION                                                   GERALDINE Rubio: 1985 scheduled for future visit at [facility]    APPOINTMENT INFORMATION:    Date: 3/27/20 11AM    Provider:  Cricket Mcintosh MD    Reason for Visit/Diagnosis: Infertility     REFERRAL INFORMATION:    Referring provider:  Self    Specialty: N/A    Referring providers clinic:  N/A    Clinic contact number:  N/A    RECORDS REQUESTED FOR VISIT                                                     NOTES  STATUS/DETAILS   OFFICE NOTE from referring provider  no   OFFICE NOTE from other specialist  yes   DISCHARGE SUMMARY from hospital  no   DISCHARGE REPORT from the ER  no   OPERATIVE REPORT  no   MEDICATION LIST  no   INFERTILITY     ALBUMIN  no   FSH  no   LAST UROLOGY/OB GYN VISIT NOTE  no   LH  no   SEMEN ANALYSIS (LAST 2)  yes   SHBG  no   T  no     PRE-VISIT CHECKLIST      Record collection complete YES- Received SA - sent to urgent scanning 3/13 2:13PM    Appointment appropriately scheduled           (right time/right provider) Yes   MyChart activation Yes   Questionnaire complete If no, please explain: In process      Completed by: Veena Hays

## 2020-03-17 ENCOUNTER — PRE VISIT (OUTPATIENT)
Dept: UROLOGY | Facility: CLINIC | Age: 35
End: 2020-03-17

## 2020-03-17 NOTE — TELEPHONE ENCOUNTER
Reason for Visit: Consult to discuss fertility    Orders/Procedures/Records:  in system     Contact Patient: n/a    Rooming Requirements: normal      Sobeida Dow LPN  03/17/20  2:43 PM

## 2020-03-18 ENCOUNTER — TELEPHONE (OUTPATIENT)
Dept: UROLOGY | Facility: CLINIC | Age: 35
End: 2020-03-18

## 2020-03-18 NOTE — TELEPHONE ENCOUNTER
LVM for patient to call clinic back to be rescheduled out to July due to COVID19    Sobeida Dow LPN

## 2020-03-20 ENCOUNTER — TELEPHONE (OUTPATIENT)
Dept: UROLOGY | Facility: CLINIC | Age: 35
End: 2020-03-20

## 2020-03-20 NOTE — TELEPHONE ENCOUNTER
Contacted patient about upcoming appointment and the need to reschedule out. Patient stated understanding and agreement with plan. Appointment changed.  Sobeida Dow LPN  03/20/20

## 2020-03-22 ENCOUNTER — HEALTH MAINTENANCE LETTER (OUTPATIENT)
Age: 35
End: 2020-03-22

## 2020-03-22 ENCOUNTER — MYC REFILL (OUTPATIENT)
Dept: INTERNAL MEDICINE | Facility: CLINIC | Age: 35
End: 2020-03-22

## 2020-03-22 DIAGNOSIS — I10 ESSENTIAL HYPERTENSION: ICD-10-CM

## 2020-03-22 RX ORDER — LISINOPRIL 20 MG/1
20 TABLET ORAL DAILY
Qty: 30 TABLET | Refills: 0 | Status: SHIPPED | OUTPATIENT
Start: 2020-03-22 | End: 2020-04-22

## 2020-03-23 NOTE — TELEPHONE ENCOUNTER
Medication is being filled for 1 time refill only due to:  Patient needs to be seen because need recheck.

## 2020-03-26 ENCOUNTER — DOCUMENTATION ONLY (OUTPATIENT)
Dept: SLEEP MEDICINE | Facility: CLINIC | Age: 35
End: 2020-03-26

## 2020-03-26 NOTE — PROGRESS NOTES
6 Month STM visit    Diagnostic AHI:  134.0 HST    Message left for patient to return call     Assessment: Pt meeting objective benchmarks.     Action plan: waiting for patient to return call.   pt to follow up per provider request    Device type: Auto-CPAP  PAP settings: CPAP min 6 cm  H20     CPAP max 18 cm  H20     90th % pressure 13.6  cm  H20    Objective measures: 14 day rolling measures         Compliance  85 %     % of night spent in large leak  3 % last  upload      AHI 3.45   last  upload      Average number of minutes 359           Objective measure goal  Compliance   Goal >70%  Leak   Goal < 10%  AHI  Goal < 5  Usage  Goal >240    Total time spent on accessing, reviewing and interpreting remote patient PAP therapy data:   10 minutes    Total time spent with direct patient communication :   0 minutes

## 2020-03-27 ENCOUNTER — PRE VISIT (OUTPATIENT)
Dept: UROLOGY | Facility: CLINIC | Age: 35
End: 2020-03-27

## 2020-04-14 ENCOUNTER — TELEPHONE (OUTPATIENT)
Dept: UROLOGY | Facility: CLINIC | Age: 35
End: 2020-04-14

## 2020-04-14 NOTE — TELEPHONE ENCOUNTER
Left voice mail message for patient to call Urology clinic back to discuss moving appointment up earlier and changing it to a Video Visit.   If patient calls back, please offer to change appointment to Video Visit and offer a sooner appointment with patient's provider.    Sobeida Dow LPN  04/14/20  11:24 AM

## 2020-04-21 ENCOUNTER — TELEPHONE (OUTPATIENT)
Dept: UROLOGY | Facility: CLINIC | Age: 35
End: 2020-04-21

## 2020-04-21 DIAGNOSIS — I10 ESSENTIAL HYPERTENSION: ICD-10-CM

## 2020-04-21 NOTE — TELEPHONE ENCOUNTER
Pt called and offered a sooner appointment. Pt stated he had to cancel his appointment because he recently lost his job and his insurance.      Rosa Maria Acuna CMA   04/21/20  1:22 PM

## 2020-04-21 NOTE — TELEPHONE ENCOUNTER
"Requested Prescriptions   Pending Prescriptions Disp Refills     lisinopril (ZESTRIL) 20 MG tablet 30 tablet 0     Sig: Take 1 tablet (20 mg) by mouth daily   Last Written Prescription Date:  03/22/20  Last Fill Quantity: 30,  # refills: 0   Last office visit: 8/23/2019 with prescribing provider:  yes   Future Office Visit:        ACE Inhibitors (Including Combos) Protocol Passed - 4/21/2020  2:26 PM        Passed - Blood pressure under 140/90 in past 12 months     BP Readings from Last 3 Encounters:   09/05/19 135/80   08/23/19 125/75   08/16/19 137/84                 Passed - Recent (12 mo) or future (30 days) visit within the authorizing provider's specialty     Patient has had an office visit with the authorizing provider or a provider within the authorizing providers department within the previous 12 mos or has a future within next 30 days. See \"Patient Info\" tab in inbasket, or \"Choose Columns\" in Meds & Orders section of the refill encounter.              Passed - Medication is active on med list        Passed - Patient is age 18 or older        Passed - Normal serum creatinine on file in past 12 months     Recent Labs   Lab Test 08/15/19  2047   CR 0.70       Ok to refill medication if creatinine is low          Passed - Normal serum potassium on file in past 12 months     Recent Labs   Lab Test 08/15/19  2047   POTASSIUM 4.2                  "

## 2020-04-22 RX ORDER — LISINOPRIL 20 MG/1
20 TABLET ORAL DAILY
Qty: 90 TABLET | Refills: 1 | Status: SHIPPED | OUTPATIENT
Start: 2020-04-22 | End: 2022-09-28

## 2021-01-15 ENCOUNTER — HEALTH MAINTENANCE LETTER (OUTPATIENT)
Age: 36
End: 2021-01-15

## 2021-05-15 ENCOUNTER — HEALTH MAINTENANCE LETTER (OUTPATIENT)
Age: 36
End: 2021-05-15

## 2021-08-29 ENCOUNTER — APPOINTMENT (OUTPATIENT)
Dept: CT IMAGING | Facility: CLINIC | Age: 36
End: 2021-08-29
Attending: EMERGENCY MEDICINE
Payer: COMMERCIAL

## 2021-08-29 ENCOUNTER — HOSPITAL ENCOUNTER (EMERGENCY)
Facility: CLINIC | Age: 36
Discharge: HOME | End: 2021-08-29
Attending: EMERGENCY MEDICINE | Admitting: EMERGENCY MEDICINE
Payer: COMMERCIAL

## 2021-08-29 VITALS
SYSTOLIC BLOOD PRESSURE: 140 MMHG | OXYGEN SATURATION: 98 % | DIASTOLIC BLOOD PRESSURE: 83 MMHG | RESPIRATION RATE: 16 BRPM | TEMPERATURE: 98.4 F | HEART RATE: 79 BPM

## 2021-08-29 DIAGNOSIS — I88.0 MESENTERIC ADENITIS: ICD-10-CM

## 2021-08-29 LAB
ALBUMIN SERPL-MCNC: 3.7 G/DL (ref 3.4–5)
ALBUMIN UR-MCNC: NEGATIVE MG/DL
ALP SERPL-CCNC: 172 U/L (ref 40–150)
ALT SERPL W P-5'-P-CCNC: 64 U/L (ref 0–70)
ANION GAP SERPL CALCULATED.3IONS-SCNC: 8 MMOL/L (ref 3–14)
APPEARANCE UR: CLEAR
AST SERPL W P-5'-P-CCNC: 41 U/L (ref 0–45)
BASOPHILS # BLD AUTO: 0 10E3/UL (ref 0–0.2)
BASOPHILS NFR BLD AUTO: 1 %
BILIRUB SERPL-MCNC: 0.4 MG/DL (ref 0.2–1.3)
BILIRUB UR QL STRIP: NEGATIVE
BUN SERPL-MCNC: 11 MG/DL (ref 7–30)
CALCIUM SERPL-MCNC: 8.9 MG/DL (ref 8.5–10.1)
CHLORIDE BLD-SCNC: 104 MMOL/L (ref 94–109)
CO2 SERPL-SCNC: 25 MMOL/L (ref 20–32)
COLOR UR AUTO: NORMAL
CREAT SERPL-MCNC: 0.81 MG/DL (ref 0.66–1.25)
D DIMER PPP FEU-MCNC: 0.78 UG/ML FEU (ref 0–0.5)
EOSINOPHIL # BLD AUTO: 0.2 10E3/UL (ref 0–0.7)
EOSINOPHIL NFR BLD AUTO: 3 %
ERYTHROCYTE [DISTWIDTH] IN BLOOD BY AUTOMATED COUNT: 14.2 % (ref 10–15)
GFR SERPL CREATININE-BSD FRML MDRD: >90 ML/MIN/1.73M2
GLUCOSE BLD-MCNC: 84 MG/DL (ref 70–99)
GLUCOSE UR STRIP-MCNC: NEGATIVE MG/DL
HCT VFR BLD AUTO: 40.2 % (ref 40–53)
HGB BLD-MCNC: 12.8 G/DL (ref 13.3–17.7)
HGB UR QL STRIP: NEGATIVE
HOLD SPECIMEN: NORMAL
HOLD SPECIMEN: NORMAL
IMM GRANULOCYTES # BLD: 0 10E3/UL
IMM GRANULOCYTES NFR BLD: 0 %
KETONES UR STRIP-MCNC: NEGATIVE MG/DL
LEUKOCYTE ESTERASE UR QL STRIP: NEGATIVE
LIPASE SERPL-CCNC: 34 U/L (ref 73–393)
LYMPHOCYTES # BLD AUTO: 1.7 10E3/UL (ref 0.8–5.3)
LYMPHOCYTES NFR BLD AUTO: 22 %
MCH RBC QN AUTO: 27 PG (ref 26.5–33)
MCHC RBC AUTO-ENTMCNC: 31.8 G/DL (ref 31.5–36.5)
MCV RBC AUTO: 85 FL (ref 78–100)
MONOCYTES # BLD AUTO: 0.6 10E3/UL (ref 0–1.3)
MONOCYTES NFR BLD AUTO: 8 %
NEUTROPHILS # BLD AUTO: 5.1 10E3/UL (ref 1.6–8.3)
NEUTROPHILS NFR BLD AUTO: 66 %
NITRATE UR QL: NEGATIVE
NRBC # BLD AUTO: 0 10E3/UL
NRBC BLD AUTO-RTO: 0 /100
PH UR STRIP: 7 [PH] (ref 5–7)
PLATELET # BLD AUTO: 424 10E3/UL (ref 150–450)
POTASSIUM BLD-SCNC: 4.1 MMOL/L (ref 3.4–5.3)
PROT SERPL-MCNC: 8.6 G/DL (ref 6.8–8.8)
RBC # BLD AUTO: 4.74 10E6/UL (ref 4.4–5.9)
RBC URINE: <1 /HPF
SODIUM SERPL-SCNC: 137 MMOL/L (ref 133–144)
SP GR UR STRIP: 1.02 (ref 1–1.03)
TROPONIN I SERPL-MCNC: <0.015 UG/L (ref 0–0.04)
UROBILINOGEN UR STRIP-MCNC: NORMAL MG/DL
WBC # BLD AUTO: 7.6 10E3/UL (ref 4–11)
WBC URINE: <1 /HPF

## 2021-08-29 PROCEDURE — 84484 ASSAY OF TROPONIN QUANT: CPT | Performed by: EMERGENCY MEDICINE

## 2021-08-29 PROCEDURE — 96374 THER/PROPH/DIAG INJ IV PUSH: CPT

## 2021-08-29 PROCEDURE — 81003 URINALYSIS AUTO W/O SCOPE: CPT | Performed by: EMERGENCY MEDICINE

## 2021-08-29 PROCEDURE — 74177 CT ABD & PELVIS W/CONTRAST: CPT | Mod: 59

## 2021-08-29 PROCEDURE — 36415 COLL VENOUS BLD VENIPUNCTURE: CPT | Performed by: EMERGENCY MEDICINE

## 2021-08-29 PROCEDURE — 250N000011 HC RX IP 250 OP 636: Performed by: EMERGENCY MEDICINE

## 2021-08-29 PROCEDURE — 99285 EMERGENCY DEPT VISIT HI MDM: CPT | Mod: 25

## 2021-08-29 PROCEDURE — 93005 ELECTROCARDIOGRAM TRACING: CPT

## 2021-08-29 PROCEDURE — 85025 COMPLETE CBC W/AUTO DIFF WBC: CPT | Performed by: EMERGENCY MEDICINE

## 2021-08-29 PROCEDURE — 80053 COMPREHEN METABOLIC PANEL: CPT | Performed by: EMERGENCY MEDICINE

## 2021-08-29 PROCEDURE — 83690 ASSAY OF LIPASE: CPT | Performed by: EMERGENCY MEDICINE

## 2021-08-29 PROCEDURE — 85379 FIBRIN DEGRADATION QUANT: CPT | Performed by: EMERGENCY MEDICINE

## 2021-08-29 PROCEDURE — 96376 TX/PRO/DX INJ SAME DRUG ADON: CPT

## 2021-08-29 PROCEDURE — 250N000009 HC RX 250: Performed by: EMERGENCY MEDICINE

## 2021-08-29 RX ORDER — OXYCODONE HYDROCHLORIDE 5 MG/1
5 TABLET ORAL EVERY 6 HOURS PRN
Qty: 12 TABLET | Refills: 0 | Status: SHIPPED | OUTPATIENT
Start: 2021-08-29 | End: 2022-09-28

## 2021-08-29 RX ORDER — HYDROMORPHONE HYDROCHLORIDE 1 MG/ML
0.5 INJECTION, SOLUTION INTRAMUSCULAR; INTRAVENOUS; SUBCUTANEOUS
Status: DISCONTINUED | OUTPATIENT
Start: 2021-08-29 | End: 2021-08-29 | Stop reason: HOSPADM

## 2021-08-29 RX ORDER — IOPAMIDOL 755 MG/ML
500 INJECTION, SOLUTION INTRAVASCULAR ONCE
Status: COMPLETED | OUTPATIENT
Start: 2021-08-29 | End: 2021-08-29

## 2021-08-29 RX ADMIN — HYDROMORPHONE HYDROCHLORIDE 0.5 MG: 1 INJECTION, SOLUTION INTRAMUSCULAR; INTRAVENOUS; SUBCUTANEOUS at 15:32

## 2021-08-29 RX ADMIN — HYDROMORPHONE HYDROCHLORIDE 0.5 MG: 1 INJECTION, SOLUTION INTRAMUSCULAR; INTRAVENOUS; SUBCUTANEOUS at 19:02

## 2021-08-29 RX ADMIN — IOPAMIDOL 100 ML: 755 INJECTION, SOLUTION INTRAVENOUS at 16:38

## 2021-08-29 RX ADMIN — SODIUM CHLORIDE 100 ML: 9 INJECTION, SOLUTION INTRAVENOUS at 16:38

## 2021-08-29 NOTE — ED TRIAGE NOTES
Right sided abdominal pain started Wednesday. Patient seen at clinic on Friday - ct scan done and showed inflammation. Patient states pain is worse today. ABC intact alert and no distress.

## 2021-08-29 NOTE — LETTER
August 29, 2021      To Whom It May Concern:      Jareth Chaparro was seen in our Emergency Department today, 08/29/21.  I expect his condition to improve over the next 3 days.  He may return to work/school when improved.    Sincerely,        Amarilys Brar RN

## 2021-08-29 NOTE — LETTER
August 29, 2021      To Whom It May Concern:      Jareth Chaparro was seen in our Emergency Department today, 08/29/21.  I expect his condition to improve over the next 2-3 days.  He may return to work when improved.    Sincerely,        Jeffry Sr MD

## 2021-08-29 NOTE — ED PROVIDER NOTES
History   Chief Complaint:  Abdominal Pain     HPI   Jareth Chaparro is a 36 year old male with history of high blood pressure, diabetes, obesity who presents with chief complaint abdominal pain.  Patient notes symptom onset about 5 or 6 days ago localized to his right upper quadrant with radiation into his right lower quadrant.  Patient was evaluated at urgent care where he had CBC and CT of the abdomen pelvis performed 2 days ago.  CT was notable for lymphadenopathy in the upper abdomen and the small bowel mesentery.  Patient also had a scrotal ultrasound performed which was negative for acute etiology.  He presents to emergency department today with ongoing pain.  He has only been taking over-the-counter pain medication.  He denies vomiting or diarrhea.  Denies fevers.  He notes right-sided chest pain that is pleuritic in nature.    Review of Systems   Constitutional: Negative for fever.   Respiratory: Negative for cough and shortness of breath.    Cardiovascular: Positive for chest pain.   Gastrointestinal: Positive for abdominal pain. Negative for constipation, diarrhea and vomiting.   All other systems reviewed and are negative.        Allergies:  No Known Drug Allergies    Medications:  Zestril  Prilosec    Past Medical History:    Juvenile osteochondrosis of spine  Depression  MANUEL  Diabetes  Hypertension  Morbid obesity  Lumbar disc disease    Past Surgical History:    Tonsillectomy  Ingrown toenail removal    Family History:    Sleep apnea, mother/brother  Multiple myeloma, father  Diabetes, mother    Social History:  Presents to the ED with wife.   Social History     Tobacco Use     Smoking status: Light Tobacco Smoker     Types: Cigars     Smokeless tobacco: Never Used     Tobacco comment: 2 times a month   Substance Use Topics     Alcohol use: Yes     Comment: socially, 1-2 times per month      Drug use: No         Physical Exam     Patient Vitals for the past 24 hrs:   BP Temp Temp src Pulse Resp SpO2    08/29/21 1900 (!) 140/83 -- -- 79 16 98 %   08/29/21 1800 131/74 -- -- 77 16 100 %   08/29/21 1745 128/73 -- -- 68 18 99 %   08/29/21 1630 121/77 -- -- 73 -- 99 %   08/29/21 1615 129/67 -- -- 68 -- 100 %   08/29/21 1600 123/76 -- -- 89 -- 99 %   08/29/21 1500 -- 98.4  F (36.9  C) Oral -- -- --   08/29/21 1222 135/80 97.6  F (36.4  C) Temporal 72 20 100 %       Physical Exam  Nursing note and vitals reviewed.  HENT:   Mouth/Throat: Moist mucous membranes.   Eyes: EOMI, nonicteric sclera  Cardiovascular: Normal rate, regular rhythm, no murmurs, rubs, or gallops  Pulmonary/Chest: Effort normal and breath sounds normal. No respiratory distress. No wheezes. No rales.   Abdominal: Soft. Mild right-sided abd pain. Negative harrison sign, nondistended, no guarding or rigidity.   Musculoskeletal: Normal range of motion.   Neurological: Alert. Moves all extremities spontaneously.   Skin: Skin is warm and dry. No rash noted.   Psychiatric: Normal mood and affect.         Emergency Department Course     ECG:  ECG taken at 1533, ECG read at 1602  Normal sinus rhythm  Normal ECG  No significant change as compared to prior, dated 08/15/2019.   Rate 68 bpm. IN interval 170 ms. QRS duration 102 ms. QT/QTc 396/421 ms. P-R-T axes 52 62 29.    Imaging:  CT Chest PE Abdomen Pelvis w contrast:  No acute abnormality in the chest, abdomen, or pelvis. No   evidence for pulmonary embolism or thoracic aortic dissection.    Reading per radiology      Laboratory:  CBC: WBC 7.6, HGB 12.8(L),    CMP: Alkaline Phosphatase 172(H), o/w WNL (Creatinine: 0.81)    UA: All Negative  Troponin (Collected 1521): <0.015  D-dimer: 0.78(H)  Lipase: 34(L)    Emergency Department Course:  Reviewed:  I reviewed the patient's nursing notes, vitals, past medical records, Care Everywhere.     Interventions:  1532 Dilaudid 0.5 mg, IV  1902 Dilaudid 0.5 mg, IV    Disposition:  The patient was discharged to home.     Impression & Plan   Medical Decision  Making:  aJreth Chaparro is a 36 year old male who presents with chief complaint right-sided abdominal pain.  Broad differential of course considered.  Patient was evaluated in urgent care 2 days ago and had only CBC and CT performed.  CT suggested some mesenteric inflammation with lymphadenopathy.  Notably, no hepatic panel or metabolic panel was performed.  Given pleuritic nature of pain, also considered PE as cause of pain.  D-dimer returned elevated therefore I discussed repeat CT imaging in addition to CT of the chest with patient and family and he was in agreement with proceeding.  Other labs are unremarkable apart from mildly elevated alk phos which may be an acute phase reactant.  Troponin negative.  CT of the chest/abdomen/pelvis is negative for acute etiology.  Notably, radiologist did comment on mesenteric lymphadenopathy as well.  This appears improved ,at least by report, compared to prior imaging.  I think patient symptoms are explained by mesenteric adenitis.  Discussed with patient and wife that this is often a viral infection and is self-limited.  Encouraged primary care follow-up this week if symptoms have not resolved.  It also appears that patient was referred to see gastroenterology by urgent care which may certainly be a consideration if symptoms do not resolve in coming days.  Reassured patient and wife that there does not appear to be any emergent etiology ongoing.  Will discharge with pain medication for symptomatic control.  Reasons for return discussed.  All questions answered.  Patient discharged in stable condition.      Covid-19  Jareth Chaparro was evaluated during a global COVID-19 pandemic, which necessitated consideration that the patient might be at risk for infection with the SARS-CoV-2 virus that causes COVID-19.   Applicable protocols for evaluation were followed during the patient's care.     Diagnosis:    ICD-10-CM    1. Mesenteric adenitis  I88.0        Discharge  Medications:  Discharge Medication List as of 8/29/2021  7:05 PM      START taking these medications    Details   oxyCODONE (ROXICODONE) 5 MG tablet Take 1 tablet (5 mg) by mouth every 6 hours as needed for pain, Disp-12 tablet, R-0, Local Print           Scribe Disclosure:  KINGS, Blanca Han, am serving as a scribe at 4:40 PM on 8/29/2021 to document services personally performed by Jeffry Sr MD based on my observations and the provider's statements to me.       Jeffry Sr MD  08/30/21 0412

## 2021-08-30 LAB
ATRIAL RATE - MUSE: 68 BPM
DIASTOLIC BLOOD PRESSURE - MUSE: NORMAL MMHG
INTERPRETATION ECG - MUSE: NORMAL
P AXIS - MUSE: 52 DEGREES
PR INTERVAL - MUSE: 170 MS
QRS DURATION - MUSE: 102 MS
QT - MUSE: 396 MS
QTC - MUSE: 421 MS
R AXIS - MUSE: 62 DEGREES
SYSTOLIC BLOOD PRESSURE - MUSE: NORMAL MMHG
T AXIS - MUSE: 29 DEGREES
VENTRICULAR RATE- MUSE: 68 BPM

## 2021-08-30 ASSESSMENT — ENCOUNTER SYMPTOMS
SHORTNESS OF BREATH: 0
VOMITING: 0
FEVER: 0
COUGH: 0
CONSTIPATION: 0
DIARRHEA: 0
ABDOMINAL PAIN: 1

## 2021-09-04 ENCOUNTER — HEALTH MAINTENANCE LETTER (OUTPATIENT)
Age: 36
End: 2021-09-04

## 2021-12-25 ENCOUNTER — HEALTH MAINTENANCE LETTER (OUTPATIENT)
Age: 36
End: 2021-12-25

## 2022-02-19 ENCOUNTER — HEALTH MAINTENANCE LETTER (OUTPATIENT)
Age: 37
End: 2022-02-19

## 2022-04-16 ENCOUNTER — HEALTH MAINTENANCE LETTER (OUTPATIENT)
Age: 37
End: 2022-04-16

## 2022-07-07 ENCOUNTER — TELEPHONE (OUTPATIENT)
Dept: UROLOGY | Facility: CLINIC | Age: 37
End: 2022-07-07

## 2022-07-07 DIAGNOSIS — N46.9 MALE INFERTILITY: Primary | ICD-10-CM

## 2022-07-07 NOTE — TELEPHONE ENCOUNTER
Called patient left generic voicemail to call back. Did state that staff sent a mychart message with all the information needed for his infertility appointment.     Before an appointment the patient should have the following competed.     Semen Analysis (Strict Morph). For the sample the patient needs to abstain from ejaculation for 2-5 days prior, and the sample should be collected in clinic. Ejaculating too frequently can deplete the count whereas abstaining for long periods of time can decrease the number of live sperm. No lubrication, powders, saliva, or intercourse can be used. Partners can be in the room and help produce the sample.   If this has already been completed it can be faxed to ([phone]).    [facility]  [facility]  [address]  [address] [place] [address] [phone]    Lab work - Testosterone free and total, FSH (follicle stimulating hormone), and Estradiol ultra sensitive. This lab work can be done at any [place] lab but must be drawn early in the morning (before 9 am).     Male infertility questionnaire - this can be mailed to the patients home or they can come 30 min early to the appointment to fill it out.    Any previous OB/GYN office notes, urology office notes, or operative reports (varicocele, and testicular surgery or treatment). This can be faxed to ([phone]).

## 2022-07-07 NOTE — LETTER
July 19, 2022        Jareth Chaparro  [address] [address] [facility] [id number]    Dear ,    We are writing to inform you about the labs to be completed prior to your 9/28/22 appointment with Dr. Mcintosh.    Before an appointment the patient should have the following competed.      Semen Analysis (Strict Morph). For the sample the patient needs to abstain from ejaculation for 2-5 days prior, and the sample should be collected in clinic. Ejaculating too frequently can deplete the count whereas abstaining for long periods of time can decrease the number of live sperm. No lubrication, powders, saliva, or intercourse can be used. Partners can be in the room and help produce the sample.   If this has already been completed it can be faxed to ([phone]).     [facility]  [facility]  [address]  [address] [address]  [phone]     Lab work - Testosterone free and total, FSH (follicle stimulating hormone), and Estradiol ultra sensitive. This lab work can be done at any [place] lab but must be drawn early in the morning (before 9 am).      Male infertility questionnaire - this can be mailed to the patients home or they can come 30 min early to the appointment to fill it out.     Any previous OB/GYN office notes, urology office notes, or operative reports (varicocele, and testicular surgery or treatment). This can be faxed to ([phone]).    If you have any questions or concerns, please call the clinic at the number listed above.     Sincerely,  Your Urology Care Team

## 2022-07-07 NOTE — TELEPHONE ENCOUNTER
[facility] Call Center    Phone Message    May a detailed message be left on voicemail: yes     Reason for Call: Order(s): Other:   Reason for requested: new infertility labs  Date needed: 7/7/22  Provider name: Dr. Mcintosh      Action Taken: Message routed to:  Adult Clinics: Urology p [id number]    Travel Screening: Not Applicable

## 2022-07-13 NOTE — TELEPHONE ENCOUNTER
Left voicemail with patient to call back, did reference that a [employer] message was sent with information as well.     Lissette Abebe LPN on 7/13/2022 at 1:50 PM

## 2022-07-19 NOTE — TELEPHONE ENCOUNTER
Left detailed message informing patient he needs to complete labs prior to his visit with Dr. Mcintosh on 9/28/22. Provided patient with call back numbers to schedule appointments.      Yuliet Foster  In-Clinic Visit Facilitator

## 2022-07-20 NOTE — TELEPHONE ENCOUNTER
Chart reviewed, three attempts made to reach patient with no response.  Closing encounter.    Nahomi De Leon RN

## 2022-08-06 ENCOUNTER — HEALTH MAINTENANCE LETTER (OUTPATIENT)
Age: 37
End: 2022-08-06

## 2022-08-17 NOTE — TELEPHONE ENCOUNTER
5th attempt, LVM to schedule labs prior to visit on 9/28/22 with Dr. Mcintosh.     Yuliet Foster  In-Clinic Visit Facilitator

## 2022-09-07 ENCOUNTER — LAB (OUTPATIENT)
Dept: LAB | Facility: CLINIC | Age: 37
End: 2022-09-07
Payer: COMMERCIAL

## 2022-09-07 DIAGNOSIS — N46.9 MALE INFERTILITY: ICD-10-CM

## 2022-09-07 PROCEDURE — 89322 SEMEN ANAL STRICT CRITERIA: CPT

## 2022-09-08 LAB
ABNORMAL SPERM MORPHOLOGY: 100
ABSTINENCE DAYS: 6 DAYS (ref 2–7)
AGGLUTINATION: NO
ANALYSIS TEMP - CENTIGRADE: 22 CENTIGRADE
COLLECTION METHOD: ABNORMAL
COLLECTION SITE: ABNORMAL
CONSENT TO RELEASE TO PARTNER: YES
DAL- RECEIVED TIME: ABNORMAL
HEAD DEFECT: 100 %
IMMOTILE: 61 %
LIQUEFIED: YES
MIDPIECE DEFECT: 42 %
NON-PROGRESSIVE MOTILITY: 3 %
NORMAL SPERM MORPHOLOGY: 0 % NORMAL FORMS
PROGRESSIVE MOTILITY: 36 %
ROUND CELLS: 0 MILLION/ML
SPECIMEN PH: 7.6 PH
SPECIMEN VOLUME: 1 ML
SPERM CONCENTRATION: 21.4 MILLION/ML
TAIL DEFECT: 12 %
TIME OF ANALYSIS: ABNORMAL
TOTAL PROGRESSIVE MOTILE NUMBER: 8 MILLION
TOTAL SPERM NUMBER: 21 MILLION
VISCOUS: NO
VITALITY: ABNORMAL

## 2022-09-09 ENCOUNTER — LAB (OUTPATIENT)
Dept: LAB | Facility: CLINIC | Age: 37
End: 2022-09-09
Payer: COMMERCIAL

## 2022-09-09 DIAGNOSIS — N46.9 MALE INFERTILITY: ICD-10-CM

## 2022-09-09 LAB — FSH SERPL IRP2-ACNC: 8.4 MIU/ML (ref 1.5–12.4)

## 2022-09-09 PROCEDURE — 36415 COLL VENOUS BLD VENIPUNCTURE: CPT

## 2022-09-09 PROCEDURE — 84270 ASSAY OF SEX HORMONE GLOBUL: CPT

## 2022-09-09 PROCEDURE — 83001 ASSAY OF GONADOTROPIN (FSH): CPT

## 2022-09-09 PROCEDURE — 82670 ASSAY OF TOTAL ESTRADIOL: CPT

## 2022-09-09 PROCEDURE — 84403 ASSAY OF TOTAL TESTOSTERONE: CPT

## 2022-09-10 LAB — SHBG SERPL-SCNC: 19 NMOL/L (ref 11–80)

## 2022-09-13 LAB
TESTOST FREE SERPL-MCNC: 8.74 NG/DL
TESTOST SERPL-MCNC: 332 NG/DL (ref 240–950)

## 2022-09-15 LAB — ESTRADIOL SERPL HS-MCNC: 26 PG/ML (ref 10–40)

## 2022-09-28 ENCOUNTER — OFFICE VISIT (OUTPATIENT)
Dept: UROLOGY | Facility: CLINIC | Age: 37
End: 2022-09-28
Payer: COMMERCIAL

## 2022-09-28 DIAGNOSIS — Z31.41 FERTILITY TESTING: ICD-10-CM

## 2022-09-28 DIAGNOSIS — R86.8 TERATOSPERMIA: Primary | ICD-10-CM

## 2022-09-28 PROCEDURE — 99203 OFFICE O/P NEW LOW 30 MIN: CPT | Performed by: UROLOGY

## 2022-09-28 NOTE — PROGRESS NOTES
Jareth Chaparro's goals for this visit include:   Chief Complaint   Patient presents with     New Patient     Infertility         He requests these members of his care team be copied on today's visit information:       PCP: No Ref-Primary, Physician    Referring Provider:  Referred Self, MD  No address on file    There were no vitals taken for this visit.    Do you need any medication refills at today's visit?     Lissette Abebe LPN on 9/28/2022 at 9:00 AM

## 2022-09-28 NOTE — PROGRESS NOTES
It was my pleasure to see Mr. Jareth Chaparro, a 37 year old male here in consultation today for fertility evaluation.  His spouse is Meseret age 35 (9/26/87).    This couple has been attempting to conceive for the last 5 yrs. They have no previous pregnancy together.  Pregnancies with other partners: she has previous pregnancy and has a 18yo daughter.  They have tried timed intercourse. They have not tried IUI or IVF.    Female factors suspected: None known.  Cycles are regular.    PAST MEDICAL HISTORY:    Past Medical History:   Diagnosis Date     Juvenile osteochondrosis of spine     chronic LBP     Mild major depression (H)      Other and unspecified disc disorder of unspecified region         PAST SURG HISTORY  Past Surgical History:   Procedure Laterality Date     TONSILLECTOMY          Medications as of 9/28/2022:  No prescription medications at this time.    ALLERGY:     Allergies   Allergen Reactions     No Known Drug Allergies        SOCIAL HISTORY:  . Occupation: warehouse.  No alcohol abuse, rare tobacco use.   Social History     Tobacco Use     Smoking status: Light Tobacco Smoker     Types: Cigars     Smokeless tobacco: Never Used     Tobacco comment: 2 times a month   Substance Use Topics     Alcohol use: Yes     Comment: socially, 1-2 times per month      Drug use: No       FAMILY HISTORY:   Family History   Problem Relation Age of Onset     Sleep Apnea Mother      Other Cancer Father      Multiple myeloma Father      Lung Cancer Maternal Grandmother      Lung Cancer Maternal Grandfather      Sleep Apnea Brother    father had a varicocele.    REVIEW OF SYSTEMS:  Significant for feeling well at present .    Denies erectile dysfunction, ejaculatory problems, testicular pain. No vision or smell deficits, no chronic sinus or respiratory infections. No recent febrile illness, weight loss. No heat or cold intolerance, gynecomastia, or other endocrine complaints.    Otherwise, no constitutional, eye,  ENT, heart, lung, GI , musculoskeletal, skin, neurologic, psychiatric, or hematologic complaints.    GONADOTOXIN EXPOSURE: Unremarkable. Otherwise negative for marijuana, heat, chemicals, pesticides, heavy metals, steroids, chemotherapy or radiation.    GENERAL PHYSICAL EXAM  Constitutional: No acute distress. Well nourished.   PSYCH: normal mood and affect.  NEURO: normal gait, no focal deficits.   EYES: anicteric, EOMI, PERR  CARDIOPULMONARY: breathing non-labored, pulse regular, no peripheral edema.  GI: Abdomen overweight, soft, non-tender, no surgical scars, no organomegaly.  MUSCULOSKELETAL: normal limb proportions, no muscle wasting, no contractures.  SKIN: Normal virilized hair distribution, no lesions, warts or rashes over genitalia, abdomen extremities or face.  HEME/LYMPH: no ecchymosis, no lymphadenopathy in groin, no lymphedema.     EXAM:  Phallus circumcised, meatus adequate.  Left testis descended , size is 15cc , consistency is normal . No intra-testicular masses.   Right testis descended , size is 15 cc , consistency is normal . No intra-testicular masses.   Epididymes present, non-tender, not enlarged.   Left cord: Vas present. No varicocele noted.  Right cord: Vas present. No varicocele noted.   Testes are both a little buried due to habitus.  No obvious varicoceles that I can tell on exam.    Rectal exam deferred.     Labs/imaging reviewed by me today:  Component      Latest Ref Rng & Units 9/7/2022 9/9/2022   Collection Method       Masturbation    Collection Site       STEVE    Time of Receipt       10:14 AM    Time of Analysis       11:00 AM    Analysis Temp - Centigrade      centigrade 22.0    Abstinence days      2 - 7 days 6    Liquefied      Yes Yes    Viscous      No No    Agglutination      No No    pH      >=7.2 pH 7.6    Volume      >=1.4 mL 1.0 (L)    Concentration      >=16.0 million/ml 21.4    Total Number      >=39.0 million 21.0 (L)    Progressive motility      >=30 % 36     Non-progressive motility      % 3    Immotile      % 61    Total Progressive Motile      million 8.00    Vitality           Normal Sperm      >=4 % Normal forms 0 (L)    Abnormal Sperm       100    Head Defect      % 100    Midpiece Defect      % 42    Tail Defect      % 12    Round Cells      <=2.0 million/ml 0.0    Consent to Release to Partner       Yes    Free Testosterone Calculated      ng/dL  8.74   Testosterone Total      240 - 950 ng/dL  332   FSH      1.5 - 12.4 mIU/mL  8.4   Estradiol Ultrasensitive      10 - 40 pg/mL  26   Sex Hormone Binding Globulin      11 - 80 nmol/L  19         ASSESSMENT:    Fertility Testing.    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    Over ideal body weight.    No obvious varicocele noted    PLAN:    Hormonal panel reviewed    Reviewed semen analysis  x 2     Discussed OTC supplement to consider taking    Discussed Clomid option, he will let us know if interested in this.    Weight loss advised, if possible.    Discussed assisted reproductive technology options.      Cricket Mcintosh MD      Additional Coding Information:    Problems:  3 -- one acute uncomplicated illness or injury    Data Reviewed  3 or more studies reviewed, as listed above     Tests ordered/pending: N/A     Notes from other providers reviewed: N/A     Level of risk:  3 -- low risk (e.g., OTC medication or observation, minor surgery without risks)    Time spent:  30 minutes spent on the date of the encounter doing chart review, history and exam, documentation and further activities per the note

## 2022-10-22 ENCOUNTER — HEALTH MAINTENANCE LETTER (OUTPATIENT)
Age: 37
End: 2022-10-22

## 2022-12-04 ENCOUNTER — HEALTH MAINTENANCE LETTER (OUTPATIENT)
Age: 37
End: 2022-12-04

## 2023-04-01 ENCOUNTER — HEALTH MAINTENANCE LETTER (OUTPATIENT)
Age: 38
End: 2023-04-01

## 2023-08-26 ENCOUNTER — HEALTH MAINTENANCE LETTER (OUTPATIENT)
Age: 38
End: 2023-08-26

## 2024-01-13 ENCOUNTER — HEALTH MAINTENANCE LETTER (OUTPATIENT)
Age: 39
End: 2024-01-13

## 2024-06-01 ENCOUNTER — HEALTH MAINTENANCE LETTER (OUTPATIENT)
Age: 39
End: 2024-06-01

## 2024-10-19 ENCOUNTER — HEALTH MAINTENANCE LETTER (OUTPATIENT)
Age: 39
End: 2024-10-19

## 2025-01-26 ENCOUNTER — HEALTH MAINTENANCE LETTER (OUTPATIENT)
Age: 40
End: 2025-01-26

## 2025-05-03 ENCOUNTER — HEALTH MAINTENANCE LETTER (OUTPATIENT)
Age: 40
End: 2025-05-03

## 2025-06-14 ENCOUNTER — HEALTH MAINTENANCE LETTER (OUTPATIENT)
Age: 40
End: 2025-06-14

## 2025-08-16 ENCOUNTER — HEALTH MAINTENANCE LETTER (OUTPATIENT)
Age: 40
End: 2025-08-16

## (undated) RX ORDER — IPRATROPIUM BROMIDE AND ALBUTEROL SULFATE 2.5; .5 MG/3ML; MG/3ML
SOLUTION RESPIRATORY (INHALATION)
Status: DISPENSED
Start: 2019-08-15